# Patient Record
Sex: MALE | Race: WHITE | Employment: FULL TIME | URBAN - METROPOLITAN AREA
[De-identification: names, ages, dates, MRNs, and addresses within clinical notes are randomized per-mention and may not be internally consistent; named-entity substitution may affect disease eponyms.]

---

## 2019-09-01 ENCOUNTER — HOSPITAL ENCOUNTER (EMERGENCY)
Facility: CLINIC | Age: 59
Discharge: HOME | End: 2019-09-01
Attending: PHYSICIAN ASSISTANT | Admitting: PHYSICIAN ASSISTANT
Payer: COMMERCIAL

## 2019-09-01 VITALS
RESPIRATION RATE: 16 BRPM | SYSTOLIC BLOOD PRESSURE: 155 MMHG | DIASTOLIC BLOOD PRESSURE: 81 MMHG | WEIGHT: 280 LBS | OXYGEN SATURATION: 97 % | HEART RATE: 102 BPM | TEMPERATURE: 97.9 F

## 2019-09-01 DIAGNOSIS — H61.21 IMPACTED CERUMEN OF RIGHT EAR: ICD-10-CM

## 2019-09-01 PROCEDURE — 69209 REMOVE IMPACTED EAR WAX UNI: CPT | Mod: RT | Performed by: PHYSICIAN ASSISTANT

## 2019-09-01 PROCEDURE — G0463 HOSPITAL OUTPT CLINIC VISIT: HCPCS | Performed by: PHYSICIAN ASSISTANT

## 2019-09-01 PROCEDURE — 99213 OFFICE O/P EST LOW 20 MIN: CPT | Mod: Z6 | Performed by: PHYSICIAN ASSISTANT

## 2019-09-01 NOTE — ED NOTES
Pt presents for otalgia in the right ear x 1 week. Pt tried OTC ear drops with no success. Dieudonne CARROLL

## 2019-09-01 NOTE — ED AVS SNAPSHOT
[facility] Emergency Department  [address] [address]  Phone:  [phone]  Fax:  [phone]                                    Esdras Snider   MRN: [id number]    Department:  [facility] Emergency Department   Date of Visit:  9/1/2019           After Visit Summary Signature Page    I have received my discharge instructions, and my questions have been answered. I have discussed any challenges I see with this plan with the nurse or doctor.    ..........................................................................................................................................  Patient/Patient Representative Signature      ..........................................................................................................................................  Patient Representative Print Name and Relationship to Patient    ..................................................               ................................................  Date                                   Time    ..........................................................................................................................................  Reviewed by Signature/Title    ...................................................              ..............................................  Date                                               Time          22EPIC Rev 08/18

## 2019-09-01 NOTE — ED PROVIDER NOTES
History     Chief Complaint   Patient presents with     Otalgia     HPI  Esdras Snider is a 59 year old male who presents to the urgent care with concern over right ear plugged which is present for approximately last week.  He additionally complains of decreased hearing.  He has not had any significant pain from the area.  No fever, chills, myalgias, cough, dyspnea, wheezing, nausea, vomiting, diarrhea or abdominal complaints.  He reports that he has had a history of cerumen impaction as previously have required irrigation in the department and states this feels similar.   He attempted to treat at home with OTC drops and irrigation without relief.      Allergies:  No Known Allergies    Problem List:    There are no active problems to display for this patient.     Past Medical History:    History reviewed. No pertinent past medical history.    Past Surgical History:    History reviewed. No pertinent surgical history.    Family History:    History reviewed. No pertinent family history.    Social History:  Marital Status:  Single [1]  Social History     Tobacco Use     Smoking status: Never Smoker     Smokeless tobacco: Never Used   Substance Use Topics     Alcohol use: None     Drug use: None      Medications:      No current outpatient medications on file.    Review of Systems  CONSTITUTIONAL:NEGATIVE for fever, chills, change in weight  INTEGUMENTARY/SKIN: NEGATIVE for worrisome rashes, moles or lesions  EYES: NEGATIVE for vision changes or irritation  ENT/MOUTH: POSITIVE for right ear plugged, decreased hearing and NEGATIVE for nasal congestion, sore throat, ear pain   RESP:NEGATIVE for significant cough or SOB  GI: NEGATIVE for nausea, vomiting, diarrhea, abdominal pain   Physical Exam   BP: (!) 155/81  Pulse: 102  Temp: 97.9  F (36.6  C)  Resp: 16  Weight: 127 kg (280 lb)  SpO2: 97 %  Physical Exam   The right TM is initially obstructed by cerumen, once removed normal: no effusions, no erythema, and normal  landmarks     The right auditory canal is obstructed with cerumen, no tenderness, canal swelling or erythema  The left TM is normal: no effusions, no erythema, and normal landmarks  The left auditory canal is normal and without drainage, edema or erythema  Oropharynx exam is normal: no lesions, erythema, adenopathy or exudate.  GENERAL: no acute distress  EYES: EOMI,  PERRL, conjunctiva clear  NECK: supple, non-tender to palpation, no adenopathy noted  RESP: lungs clear to auscultation - no rales, rhonchi or wheezes  CV: regular rates and rhythm, normal S1 S2, no murmur noted  SKIN: no suspicious lesions or rashes   ED Course        Procedures        Critical Care time:  none        No results found for this or any previous visit (from the past 24 hour(s)).  Medications - No data to display    Earwax irrigated by LPN upon reinspection.  Canal was clear free of cerumen and there is no evidence of otitis media, otitis externa, patient reported symptomatic improvement.      Assessments & Plan (with Medical Decision Making)     I have reviewed the nursing notes.  I have reviewed the findings, diagnosis, plan and need for follow up with the patient.       New Prescriptions    No medications on file     Final diagnoses:   Impacted cerumen of right ear     59-year-old male presents to the urgent care with concern over blood right ear which is been present the last week.  He had mildly elevated heart rate upon arrival which had been decreased significantly at time of examination.  Physical exam findings as described above are consistent with cerumen impaction.  No evidence of otitis media, otitis externa, mastoiditis, TMJ.  Patient tolerated irrigation of the ear with good symptom medic improvement.  He was discharged home stable with instructions to follow-up as needed if new or worsening symptoms.      Disclaimer: This note consists of symbols derived from keyboarding, dictation, and/or voice recognition software. As a  result, there may be errors in the script that have gone undetected.  Please consider this when interpreting information found in the chart.    9/1/2019   [facility] EMERGENCY DEPARTMENT     Mame Petersen PA-C  09/01/19 [id number]

## 2020-01-16 ENCOUNTER — HOSPITAL ENCOUNTER (EMERGENCY)
Facility: CLINIC | Age: 60
Discharge: HOME | End: 2020-01-16
Attending: NURSE PRACTITIONER | Admitting: NURSE PRACTITIONER
Payer: OTHER MISCELLANEOUS

## 2020-01-16 ENCOUNTER — APPOINTMENT (OUTPATIENT)
Dept: CT IMAGING | Facility: CLINIC | Age: 60
End: 2020-01-16
Attending: NURSE PRACTITIONER
Payer: OTHER MISCELLANEOUS

## 2020-01-16 VITALS
WEIGHT: 275 LBS | DIASTOLIC BLOOD PRESSURE: 90 MMHG | RESPIRATION RATE: 16 BRPM | OXYGEN SATURATION: 96 % | SYSTOLIC BLOOD PRESSURE: 171 MMHG | TEMPERATURE: 98 F

## 2020-01-16 DIAGNOSIS — S06.0X0A CONCUSSION WITHOUT LOSS OF CONSCIOUSNESS: ICD-10-CM

## 2020-01-16 DIAGNOSIS — S09.90XA HEAD INJURY, INITIAL ENCOUNTER: ICD-10-CM

## 2020-01-16 PROCEDURE — 70486 CT MAXILLOFACIAL W/O DYE: CPT

## 2020-01-16 PROCEDURE — 99213 OFFICE O/P EST LOW 20 MIN: CPT | Mod: Z6 | Performed by: NURSE PRACTITIONER

## 2020-01-16 PROCEDURE — G0463 HOSPITAL OUTPT CLINIC VISIT: HCPCS | Mod: 25 | Performed by: NURSE PRACTITIONER

## 2020-01-16 NOTE — ED AVS SNAPSHOT
[facility]  [address] [address]  Phone:  [phone]  Fax:  [phone]                                    Esdras Snider   MRN: [id number]    Department:  [facility]   Date of Visit:  1/16/2020           After Visit Summary Signature Page    I have received my discharge instructions, and my questions have been answered. I have discussed any challenges I see with this plan with the nurse or doctor.    ..........................................................................................................................................  Patient/Patient Representative Signature      ..........................................................................................................................................  Patient Representative Print Name and Relationship to Patient    ..................................................               ................................................  Date                                   Time    ..........................................................................................................................................  Reviewed by Signature/Title    ...................................................              ..............................................  Date                                               Time          22EPIC Rev 08/18

## 2020-01-16 NOTE — DISCHARGE INSTRUCTIONS
You may take Tylenol or ibuprofen as needed for headache.  I recommend minimize your screen time which means TV time computer time phone time and reading time to allow your brain to rest.  I recommend follow-up with the provider scheduled on Monday who can reassess you and release you back to work.  Return to the emergency room if you should develop severe headache, forgetfulness, speech difficulty.

## 2020-01-16 NOTE — ED PROVIDER NOTES
History     Chief Complaint   Patient presents with     Head Injury     trailer door hit pt in the left temporal area no LOC workmans comp left eyebrow area at approx 12:10 no bleeding no blood thinners     HPI  Esdras Snider is a 59 year old male with a negative PMH who presents to urgent care with left temporal head injury.    PT states he was getting out to open the trailer doors and the handle of the door swung and hit patient in his left temporal region.  There was no loss of consciousness. Pt felt a little wobbly on his feet and this lasted a couple of hours.    Pt describes his pain as very little and feels tightness and located in left temple region.  Pt rates pain level currently 01/10.  Pt states initially 08/10.  Pt has not taken any medication for the pain.    Pt denies any vision or hearing or speech changes.  Pt denies confusion.    Patient denies fever, aches, chills, sweats, ear pain, eye pain, throat pain, cough, wheezing, shortness of breath, abdominal pain, nausea, vomiting, diarrhea, dysuria, speech difficulty, mental confusion, thoughts of harming self.    Allergies:  No Known Allergies    Problem List:    There are no active problems to display for this patient.       Past Medical History:    History reviewed. No pertinent past medical history.    Past Surgical History:    History reviewed. No pertinent surgical history.    Family History:    No family history on file.    Social History:  Marital Status:  Single [1]  Social History     Tobacco Use     Smoking status: Never Smoker     Smokeless tobacco: Never Used   Substance Use Topics     Alcohol use: None     Drug use: None        Medications:    No current outpatient medications on file.    Review of Systems  As mentioned above in the history present illness. All other systems were reviewed and are negative.    Physical Exam   BP: (!) 171/90  Heart Rate: 90  Temp: 98  F (36.7  C)  Resp: 16  Weight: 124.7 kg (275 lb)  SpO2: 96  %      Physical Exam  Vitals signs and nursing note reviewed.   Constitutional:       General: He is not in acute distress.     Appearance: Normal appearance. He is well-developed. He is not ill-appearing, toxic-appearing or diaphoretic.   HENT:      Head: Normocephalic. Abrasion (noted above and behind left eyebrow1 cm by 3 mm) present. No raccoon eyes, Limon's sign, contusion, right periorbital erythema or left periorbital erythema.      Jaw: There is normal jaw occlusion. No trismus.        Right Ear: Hearing, tympanic membrane, ear canal and external ear normal. No drainage or tenderness. Tympanic membrane is not erythematous or bulging.      Left Ear: Hearing, ear canal and external ear normal. No drainage or tenderness. Tympanic membrane is not erythematous or bulging.      Nose: Mucosal edema present. No rhinorrhea.      Right Sinus: Maxillary sinus tenderness present. No frontal sinus tenderness.      Left Sinus: Maxillary sinus tenderness present. No frontal sinus tenderness.      Mouth/Throat:      Pharynx: Uvula midline. No posterior oropharyngeal erythema or uvula swelling.      Tonsils: No tonsillar exudate. Swellin+ on the right. 1+ on the left.   Eyes:      General: No scleral icterus.        Right eye: No discharge.         Left eye: No discharge.      Conjunctiva/sclera: Conjunctivae normal.      Pupils: Pupils are equal, round, and reactive to light.   Neck:      Musculoskeletal: Normal range of motion.      Trachea: No tracheal deviation.   Cardiovascular:      Rate and Rhythm: Normal rate and regular rhythm.      Heart sounds: Normal heart sounds. No murmur. No friction rub. No gallop.    Pulmonary:      Effort: Pulmonary effort is normal. No respiratory distress.      Breath sounds: No stridor. No wheezing or rales.   Lymphadenopathy:      Cervical: No cervical adenopathy.   Skin:     General: Skin is warm.      Capillary Refill: Capillary refill takes less than 2 seconds.      Findings: No  rash.   Neurological:      General: No focal deficit present.      Mental Status: He is alert and oriented to person, place, and time.      Cranial Nerves: No cranial nerve deficit.      Sensory: No sensory deficit.      Motor: No weakness.      Coordination: Coordination normal.      Gait: Gait normal.   Psychiatric:         Mood and Affect: Mood normal.         Behavior: Behavior is cooperative.         Thought Content: Thought content normal.         ED Course        Procedures    Results for orders placed or performed during the hospital encounter of 01/16/20 (from the past 24 hour(s))   CT Facial Bones without Contrast    Narrative    CT SCAN OF THE PARANASAL SINUSES AND FACE  1/16/2020 3:34 PM     HISTORY: Facial trauma, fx suspected    TECHNIQUE: Radiation dose for this scan was reduced using automated  exposure control, adjustment of the mA and/or kV according to patient  size, or iterative reconstruction technique.  Noncontrast axial scans  and coronal and sagittal reformations.    COMPARISON: None.    FINDINGS: The bony walls of the frontal sinuses, ethmoid sinuses, the  maxillary sinuses are intact. Bony walls of the orbits appear normal.  Paranasal sinuses are clear. Zygomatic arches appear normal. The  mandible is negative. Nasal bones appear normal.      Impression    IMPRESSION: Osseous structures appear intact. No facial fractures are  identified.    REYES OROURKE MD       Medications - No data to display    Assessments & Plan (with Medical Decision Making)  Patient presents to urgent care with work-related injury left-sided temporal injury without loss of consciousness or neurological deficits.  CT of the facial structures completed to assess for fracture and no fracture noted.  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  Appointment made for reassessment.  Reviewed with patient worrisome signs to return for  subdural or epidural hematoma.  Patient discharged in stable condition.     I have reviewed the nursing notes.    I have reviewed the findings, diagnosis, plan and need for follow up with the patient.       [place] Head CT Rule  (calculator)  Background  Assesses need for head imaging in acute trauma  Only validated in adults with GCS 13-15 with witnessed LOC, amnesia to head injury or confusion  Data  59 year old  High Risk Criteria (major criteria)   Of 5 possible items  NEGATIVE    Moderate Risk Criteria (minor criteria)   Of 3 possible items  NEGATIVE    Interpretation  No indications for head imaging    [place] Head CT Rule  (calculator)  Background  Assesses need for head imaging in acute trauma  Only validated in adults 18 and older, with GCS 15 and with blunt head trauma occurring within the prior 24 hours and resulting in loss of consciousness, amnesia, or disorientation  Data   59 year old  Head CT is NOT indicated if ALL of the following criteria ABSENT   Of 7 possible items (headache, vomiting, age>60, intoxicated, anterograde amnesia, supraclavicular signs of trauma, seizure)  Physical evidence of trauma above the clavicles  Interpretation  One or more Head CT criteria are present; Head imaging may be indicated        New Prescriptions    No medications on file       Final diagnoses:   Head injury, initial encounter   Concussion without loss of consciousness       1/16/2020   [facility] EMERGENCY DEPARTMENT     Concepción Valenzuela, GRABIEL CNP  01/16/20 6710

## 2020-01-20 ENCOUNTER — OFFICE VISIT (OUTPATIENT)
Dept: FAMILY MEDICINE | Facility: CLINIC | Age: 60
End: 2020-01-20
Payer: COMMERCIAL

## 2020-01-20 VITALS
WEIGHT: 272.4 LBS | HEART RATE: 92 BPM | SYSTOLIC BLOOD PRESSURE: 124 MMHG | DIASTOLIC BLOOD PRESSURE: 90 MMHG | TEMPERATURE: 98.3 F | OXYGEN SATURATION: 95 % | BODY MASS INDEX: 38.14 KG/M2 | HEIGHT: 71 IN

## 2020-01-20 DIAGNOSIS — I10 HYPERTENSION, UNSPECIFIED TYPE: ICD-10-CM

## 2020-01-20 DIAGNOSIS — S09.90XD CLOSED HEAD INJURY, SUBSEQUENT ENCOUNTER: Primary | ICD-10-CM

## 2020-01-20 PROCEDURE — 99213 OFFICE O/P EST LOW 20 MIN: CPT | Performed by: FAMILY MEDICINE

## 2020-01-20 RX ORDER — NEBULIZER AND COMPRESSOR
1 EACH MISCELLANEOUS 2 TIMES DAILY
Qty: 1 KIT | Refills: 1 | Status: SHIPPED | OUTPATIENT
Start: 2020-01-20 | End: 2022-03-02

## 2020-01-20 ASSESSMENT — MIFFLIN-ST. JEOR: SCORE: 2077.73

## 2020-01-20 NOTE — LETTER
[facility]  [address] [place] [address]  [phone]    RE:  Esdras Snider  [address]  [facility] [id number] [phone] (home)   January 20, 2020    TO WHOM IT MAY CONCERN:    Esdras Snider was seen on 1/20/2020.  Please excuse him until 1/21/2020 due to injury.  He may return to work without restrictions on 1/21/2020.    Cordially,      ANCA BUSTOS MD.

## 2020-01-20 NOTE — PATIENT INSTRUCTIONS
Patient Education     Uncontrolled High Blood Pressure (Established)    Your blood pressure was unusually high today. This can occur if you ve missed doses of your blood pressure medicine. Or it can happen if you are taking other medicines. These include some asthma inhalers, decongestants, diet pills, and street drugs like cocaine and amphetamine.  Other causes include:    Weight gain    More salt in your diet    Smoking    Caffeine  Your blood pressure can also rise if you are emotionally upset or in intense pain. It may go back to normal after a period of rest.  Blood pressure measurements are given as 2 numbers. Systolic blood pressure is the upper number. This is the pressure when the heart contracts. Diastolic blood pressure is the lower number. This is the pressure when the heart relaxes between beats. You will see your blood pressure readings written together. For example, a person with a systolic pressure of 118 and a diastolic pressure of 78 will have 118/78 written in the medical record. To be high blood pressure, the numbers must be higher when tested over a period of time.  Blood pressure is categorized as normal, elevated, or stage 1 or stage 2 high blood pressure:    Normal blood pressure is systolic of less than 120 and diastolic of less than 80 (120/80)    Elevated blood pressure is systolic of 120 to 129 and diastolic less than 80    Stage 1 high blood pressure is systolic is 130 to 139 or diastolic between 80 to 89    Stage 2 high blood pressure is when systolic is 140 or higher or the diastolic is 90 or higher  Uncontrolled high blood pressure can cause serious health problems. It raises your risk for heart attack, stroke, and heart failure. In general, if you have high blood pressure, keeping your blood pressure below 130/80 mmHg may help prevent these problems. Your healthcare provider may prescribe medicine to help control blood pressure if lifestyle changes are not enough.  Home care  It s  important to take steps to lower your blood pressure. If you are taking blood pressure medicine, the guidelines below may help you need less or no medicines in the future.    Start a weight-loss program if you are overweight.    Cut back on the amount of salt in your diet:  ? Avoid high-salt foods like olives, pickles, smoked meats, and salted potato chips.  ? Don t add salt to your food at the table.  ? Use only small amounts of salt when cooking.    Start an exercise program. Talk with your healthcare provider about what exercise program is best for you. It doesn t have to be difficult. Even brisk walking for 20 minutes 3 times a week is a good form of exercise.    Avoid medicines that stimulates the heart. This includes many over-the-counter cold and sinus decongestant pills and sprays, as well as diet pills. Check the warnings about high blood pressure on the label. Before purchasing any over-the-counter medicines or supplements, always ask the pharmacist about the product's potential interaction with your high blood pressure and your medicines.    Stimulants such as amphetamine or cocaine could be lethal for someone with high blood pressure. Never take these.    Limit how much caffeine you drink. Or switch to noncaffeinated beverages.    Stop smoking. If you are a long-time smoker, this can be hard. Enroll in a stop-smoking program to make it more likely that you will succeed. Talk with your provider about ways to quit.    Learn how to handle stress better. This is an important part of any program to lower blood pressure. Learn ways to relax. These include meditation, yoga, and biofeedback.    If medicines were prescribed, take them exactly as directed. Missing doses may cause your blood pressure to get out of control.    If you miss a dose or doses of your medicines, check with your healthcare provider or pharmacist about what to do.    Consider buying an automatic blood pressure machine. Your provider may  recommend a certain type. You can get one of these at most pharmacies. Measure your blood pressure twice a day, in the morning, and in the late afternoon. Keep a written record of your home blood pressure readings and take the record to your medical appointments.  Here are some additional guidelines on home blood pressure monitoring from the American [employer] Association.    Don't smoke or drink coffee for 30 minutes    Go to the bathroom before the test.    Relax for 5 minutes before taking the measurement.    Sit correctly. Be sure your back is supported. Don't sit on a couch or soft chair. Uncross your feet and place them flat on the floor. Place your arm on a solid, flat surface like a table with the upper arm at heart level. Make certain the middle of the cuff is directly above the bend of the elbow. Check the monitor's instruction manual for an illustration.    Take multiple readings. When you measure, take 2 or 3 readings one minute apart and record all of the results.    Take your blood pressure at the same time every day, or as your healthcare provider recommends.    Record the date, time, and blood pressure reading.    Take the record with you to your next appointment. If your blood pressure monitor has a built-in memory, simply take the monitor with you to your next appointment.    Call your provider if you have several high readings. Don't be frightened by a single high reading, but if you get several high readings, check in with your healthcare provider.    Note: When blood pressure reaches a systolic (top number) of 180 or higher or a diastolic (bottom number) of 110 or higher, emergency medical treatment is required. Call your healthcare provider immediately.  Follow-up care  Regular visits to your own healthcare provider for blood pressure and medicine checks are an important part of your care. Make a follow-up appointment as directed. Bring the record of your home blood pressure readings to the  appointment.  When to seek medical advice  Call your healthcare provider right away if any of these occur:    Blood pressure reaches a systolic (top number) of 180 or higher or diastolic (bottom number) of 110 or higher, emergency medical treatment is required.    Chest, arm, shoulder, neck, or upper back pain    Shortness of breath    Severe headache    Throbbing or rushing sound in the ears    Nosebleed    Extreme drowsiness, confusion, or fainting    Dizziness or dizziness with spinning sensation (vertigo)    Weakness in an arm or leg or on one side of the face    Trouble speaking or seeing   Date Last Reviewed: 1/1/2017 2000-2019 [employer]. [address] [place] [address]. All rights reserved. This information is not intended as a substitute for professional medical care. Always follow your healthcare professional's instructions.

## 2020-01-20 NOTE — NURSING NOTE
"Initial BP (!) 134/92 (BP Location: Left arm, Patient Position: Sitting, Cuff Size: Adult Large)   Pulse 92   Temp 98.3  F (36.8  C) (Tympanic)   Ht 1.803 m (5' 11\")   Wt 123.6 kg (272 lb 6.4 oz)   SpO2 95%   BMI 37.99 kg/m   Estimated body mass index is 37.99 kg/m  as calculated from the following:    Height as of this encounter: 1.803 m (5' 11\").    Weight as of this encounter: 123.6 kg (272 lb 6.4 oz). .       "

## 2020-01-20 NOTE — PROGRESS NOTES
"    SUBJECTIVE:                                                    Esdras Snider is 59 year old male   Chief Complaint   Patient presents with     Hospital F/U     Head injury hit in the head with the handle from a trailer on Thursday evening seen at urgent care          Problem list and histories reviewed & adjusted, as indicated.  Additional history:  Mild tenderness left forehead, no headache or vision difficulty.    There is no problem list on file for this patient.    No past surgical history on file.    Social History     Tobacco Use     Smoking status: Never Smoker     Smokeless tobacco: Never Used   Substance Use Topics     Alcohol use: Not on file     No family history on file.      No current outpatient medications on file.     No Known Allergies  No lab results found.   BP Readings from Last 3 Encounters:   01/20/20 (!) 134/92   01/16/20 (!) 171/90   09/01/19 (!) 155/81    Wt Readings from Last 3 Encounters:   01/20/20 123.6 kg (272 lb 6.4 oz)   01/16/20 124.7 kg (275 lb)   09/01/19 127 kg (280 lb)         ROS:  Constitutional, HEENT, cardiovascular, pulmonary, gi and gu systems are negative, except as otherwise noted.    OBJECTIVE:                                                    BP (!) 134/92 (BP Location: Left arm, Patient Position: Sitting, Cuff Size: Adult Large)   Pulse 92   Temp 98.3  F (36.8  C) (Tympanic)   Ht 1.803 m (5' 11\")   Wt 123.6 kg (272 lb 6.4 oz)   SpO2 95%   BMI 37.99 kg/m    GENERAL APPEARANCE ADULT: Alert, no acute distress  HENT: Ears and TMs normal, oral mucosa and posterior oropharynx normal  RESP: lungs clear to auscultation   NEURO: Alert, oriented, speech and mentation normal, Cranial nerves 2-12 are normal., Strength normal and symmetrical in upper and lower extremities., Reflexes 2+ and symmetrical at biceps, triceps, brachioradialis, knees and ankles  PSYCH: mentation appears normal., affect and mood normal  Diagnostic Test Results:  none      ASSESSMENT/PLAN:         "                                            1. Closed head injury, subsequent encounter  No signs or sxs of head bleed, injury occurred 4 days ago.  Ok to return to work.      2. Hypertension, unspecified type  Handout on high blood pressure given  - Blood Pressure Monitoring (ADULT BLOOD PRESSURE CUFF LG) KIT; 1 Device 2 times daily  Dispense: 1 kit; Refill: 1    Trina Freeman MD  [facility]

## 2020-01-27 PROBLEM — I10 HYPERTENSION, UNSPECIFIED TYPE: Status: ACTIVE | Noted: 2020-01-27

## 2020-05-14 ENCOUNTER — VIRTUAL VISIT (OUTPATIENT)
Dept: FAMILY MEDICINE | Facility: CLINIC | Age: 60
End: 2020-05-14
Payer: COMMERCIAL

## 2020-05-14 DIAGNOSIS — H10.32 ACUTE CONJUNCTIVITIS OF LEFT EYE, UNSPECIFIED ACUTE CONJUNCTIVITIS TYPE: Primary | ICD-10-CM

## 2020-05-14 PROCEDURE — 99213 OFFICE O/P EST LOW 20 MIN: CPT | Mod: 95 | Performed by: NURSE PRACTITIONER

## 2020-05-14 RX ORDER — POLYMYXIN B SULFATE AND TRIMETHOPRIM 1; 10000 MG/ML; [USP'U]/ML
1-2 SOLUTION OPHTHALMIC EVERY 6 HOURS
Qty: 10 ML | Refills: 0 | Status: SHIPPED | OUTPATIENT
Start: 2020-05-14 | End: 2020-12-10

## 2020-05-14 NOTE — PATIENT INSTRUCTIONS
Patient Education     Conjunctivitis, Nonspecific    The membrane that covers the white part of your eye (the conjunctiva) is inflamed. Inflammation happens when your body responds to an injury, allergic reaction, infection, or illness. Symptoms of inflammation in the eye may include redness, irritation, itching, swelling, or burning. These symptoms should go away within the next 24 hours. Conjunctivitis may be related to a particle that was in your eye. If so, it may wash out with your tears or irrigation treatment. Being exposed to liquid chemicals or fumes may also cause this reaction.   Home care    Apply a cold pack over the eye for 20 minutes at a time. This will reduce pain. To make a cold pack, put ice cubes in a plastic bag that seals at the top. Wrap the bag in a clean, thin towel or cloth.    Artificial tears may be prescribed to reduce irritation or redness.  These should be used 3 to 4 times a day.    You may use acetaminophen or ibuprofen to control pain, unless another medicine was prescribed. (Note: If you have chronic liver or kidney disease, or if you have ever had a stomach ulcer or gastrointestinal bleeding, talk with your healthcare provider before using these medicines.)  Follow-up care  Follow up with your healthcare provider, or as advised.  When to seek medical advice  Call your healthcare provider right away if any of these occur:    Increased eyelid swelling    Increased eye pain    Increased redness or drainage from the eye    Increased blurry vision or increased sensitivity to light    Failure of normal vision to return within 24 to 48 hours  Date Last Reviewed: 7/1/2017 2000-2019 [employer]. [address] [place] [address], [place] [address]. All rights reserved. This information is not intended as a substitute for professional medical care. Always follow your healthcare professional's instructions.

## 2020-05-14 NOTE — PROGRESS NOTES
"Esdras Snider is a 60 year old male who is being evaluated via a billable telephone visit.      The patient has been notified of following:     \"This telephone visit will be conducted via a call between you and your physician/provider. We have found that certain health care needs can be provided without the need for a physical exam.  This service lets us provide the care you need with a short phone conversation.  If a prescription is necessary we can send it directly to your pharmacy.  If lab work is needed we can place an order for that and you can then stop by our lab to have the test done at a later time.    Telephone visits are billed at different rates depending on your insurance coverage. During this emergency period, for some insurers they may be billed the same as an in-person visit.  Please reach out to your insurance provider with any questions.    If during the course of the call the physician/provider feels a telephone visit is not appropriate, you will not be charged for this service.\"    Patient has given verbal consent for Telephone visit?  Yes    What phone number would you like to be contacted at? [phone]    How would you like to obtain your AVS? Does not want AVS    Subjective     Esdras Snider is a 60 year old male who presents to clinic today for the following health issues:    HPI  Sore eye       Duration:  X 3 days     Description (location/character/radiation): left eye red, mattery, sore. NO fever     Intensity:  mild    Accompanying signs and symptoms: as noted     History (similar episodes/previous evaluation):     Precipitating or alleviating factors:     Therapies tried and outcome: None      No vision changes - just blurry initially.  Mild eye lid swelling.  No eye pain.    More irritated.  No recent illness, trauma.    Does not wear contacts.    Patient Active Problem List   Diagnosis     UC (ulcerative colitis) (H)     Ulcerative rectosigmoiditis without complication (H)     " Hypertension, unspecified type     History reviewed. No pertinent surgical history.    Social History     Tobacco Use     Smoking status: Former Smoker     Smokeless tobacco: Never Used   Substance Use Topics     Alcohol use: Yes     History reviewed. No pertinent family history.      Current Outpatient Medications   Medication Sig Dispense Refill     trimethoprim-polymyxin b (POLYTRIM) 76215-5.1 UNIT/ML-% ophthalmic solution Place 1-2 drops Into the left eye every 6 hours 10 mL 0     Blood Pressure Monitoring (ADULT BLOOD PRESSURE CUFF LG) KIT 1 Device 2 times daily 1 kit 1     No Known Allergies  No lab results found.   BP Readings from Last 3 Encounters:   01/20/20 (!) 124/90   01/16/20 (!) 171/90   09/01/19 (!) 155/81    Wt Readings from Last 3 Encounters:   01/20/20 123.6 kg (272 lb 6.4 oz)   01/16/20 124.7 kg (275 lb)   09/01/19 127 kg (280 lb)                    Reviewed and updated as needed this visit by Provider  Tobacco  Allergies  Meds  Problems  Med Hx  Surg Hx  Fam Hx         Review of Systems   Constitutional, HEENT, cardiovascular, pulmonary, GI, , musculoskeletal, neuro, skin, endocrine and psych systems are negative, except as otherwise noted.       Objective   Reported vitals:  There were no vitals taken for this visit.   healthy, alert and no distress  PSYCH: Alert and oriented times 3; coherent speech, normal   rate and volume, able to articulate logical thoughts, able   to abstract reason, no tangential thoughts, no hallucinations   or delusions  His affect is normal and pleasant  RESP: No cough, no audible wheezing, able to talk in full sentences  Remainder of exam unable to be completed due to telephone visits    Diagnostic Test Results:  Labs reviewed in Epic        Assessment/Plan:  1. Acute conjunctivitis of left eye, unspecified acute conjunctivitis type  The risks, benefits and treatment options of prescribed medications or other treatments have been discussed with the patient.  The patient verbalized their understanding and should call or follow up if no improvement or if they develop further problems.    - trimethoprim-polymyxin b (POLYTRIM) 38184-4.1 UNIT/ML-% ophthalmic solution; Place 1-2 drops Into the left eye every 6 hours  Dispense: 10 mL; Refill: 0    Return if symptoms worsen or fail to improve.      Phone call duration:  5 minutes    Seda Mcclelland, NP

## 2020-09-08 DIAGNOSIS — H10.32 ACUTE CONJUNCTIVITIS OF LEFT EYE, UNSPECIFIED ACUTE CONJUNCTIVITIS TYPE: ICD-10-CM

## 2020-09-09 NOTE — TELEPHONE ENCOUNTER
Seda,    Looks like patient was given this medication back on 5/14/2020 for conjunctivitis.  Please advise on refill. Heidi LUCIO RN

## 2020-09-10 RX ORDER — POLYMYXIN B SULFATE AND TRIMETHOPRIM 1; 10000 MG/ML; [USP'U]/ML
1-2 SOLUTION OPHTHALMIC EVERY 6 HOURS
Qty: 10 ML | Refills: 0 | OUTPATIENT
Start: 2020-09-10

## 2020-09-10 NOTE — TELEPHONE ENCOUNTER
Polytrim eye drops prescribed in May for acute conjuctivitis.    I spoke with pt.  Symptoms resolved in May after being treated.  Recurred about a week ago with watery left eye and crusting of both eyes.    Advised virtual appt.     Pt says that he will call back to schedule appt if no improvement over the next couple days.    Dunia Dean RN

## 2020-09-10 NOTE — TELEPHONE ENCOUNTER
Call and find out why patient needs refill - typically this is not something that is refilled.  ? Allergies.  LUTHER Raman

## 2020-12-10 ENCOUNTER — OFFICE VISIT (OUTPATIENT)
Dept: FAMILY MEDICINE | Facility: CLINIC | Age: 60
End: 2020-12-10
Payer: COMMERCIAL

## 2020-12-10 VITALS
HEIGHT: 71 IN | WEIGHT: 260.8 LBS | BODY MASS INDEX: 36.51 KG/M2 | OXYGEN SATURATION: 97 % | DIASTOLIC BLOOD PRESSURE: 80 MMHG | SYSTOLIC BLOOD PRESSURE: 146 MMHG | HEART RATE: 86 BPM | TEMPERATURE: 96.5 F

## 2020-12-10 DIAGNOSIS — R03.0 ELEVATED BP WITHOUT DIAGNOSIS OF HYPERTENSION: ICD-10-CM

## 2020-12-10 DIAGNOSIS — L70.0 ACNE VULGARIS: ICD-10-CM

## 2020-12-10 DIAGNOSIS — E66.01 MORBID OBESITY (H): ICD-10-CM

## 2020-12-10 DIAGNOSIS — M54.50 ACUTE BILATERAL LOW BACK PAIN WITHOUT SCIATICA: Primary | ICD-10-CM

## 2020-12-10 PROCEDURE — 99214 OFFICE O/P EST MOD 30 MIN: CPT | Performed by: NURSE PRACTITIONER

## 2020-12-10 RX ORDER — PREDNISONE 20 MG/1
40 TABLET ORAL DAILY
Qty: 10 TABLET | Refills: 0 | Status: SHIPPED | OUTPATIENT
Start: 2020-12-10 | End: 2020-12-15

## 2020-12-10 RX ORDER — MINOCYCLINE HYDROCHLORIDE 100 MG/1
100 TABLET ORAL 2 TIMES DAILY
Qty: 60 TABLET | Refills: 2 | Status: SHIPPED | OUTPATIENT
Start: 2020-12-10 | End: 2021-07-15

## 2020-12-10 ASSESSMENT — PAIN SCALES - GENERAL: PAINLEVEL: MODERATE PAIN (5)

## 2020-12-10 ASSESSMENT — MIFFLIN-ST. JEOR: SCORE: 2020.11

## 2020-12-10 NOTE — PATIENT INSTRUCTIONS
1.  Take prednisone as directed.  2.  Use exercises that strengthen your back that I have given in a handout.  3.  Follow-up if any changes in symptoms or they become worse.  4.  Lower salt intake and start exercise.  5.  If BP remains elevated despite lifestyle changes, we may need to start BP medication.    Use benzoyl peroxide once or twice daily to back to dry out the back to reduce cyst formation.    Start minocycline twice daily and stop when symptoms improve, can take up to 12 weeks and then would need to follow-up if not improving.    Patient Education     Exercises to Strengthen Your Lower Back  Strong lower back and abdominal muscles work together to support your spine. The exercises below will help strengthen the lower back. It's important that you start exercising slowly and increase levels gradually.   Always start any exercise program with stretching. If you feel pain while doing any of these exercises, stop and talk to your doctor about a more specific exercise program that better suits your condition.    Low back stretch  The point of stretching is to make you more flexible and increase your range of motion. Stretch only as much as you are able. Stretch slowly. Don't push your stretch to the limit. If at any point you feel pain while stretching, this is your (temporary) limit.     Lie on your back with your knees bent and both feet on the ground.    Slowly raise your left knee to your chest as you flatten your lower back against the floor. Hold for 5 seconds.    Relax and repeat the exercise with your right knee.    Do 10 of these exercises for each leg.    Repeat hugging both knees to your chest at the same time.  Building lower back strength  Start your exercise routine with 10 to 30 minutes a day, 1 to 3 times a day.  Initial exercises  Lying on your back:  1. Ankle pumps. Move your foot up and down, towards your head, and then away. Repeat 10 times with each foot.  2. Heel slides. Slowly bend  your knee, drawing the heel of your foot towards you. Then slide your heel/foot from you, straightening your knee. Don't lift your foot off the floor (this is not a leg lift).  3. Abdominal contraction. Bend your knees and put your hands on your stomach. Tighten your stomach muscles. Hold for 5 seconds, then relax. Repeat 10 times.  4. Straight leg raise. Bend one leg at the knee and keep the other leg straight. Tighten your stomach muscles. Slowly lift your straight leg 6 to 12 inches off the floor and hold for up to 5 seconds. Repeat 10 times on each side.  Standin. Wall squats. Stand with your back against the wall. Move your feet about 12 inches away from the wall. Tighten your stomach muscles, and slowly bend your knees until they are at about a 45 degree angle. Don't go down too far. Hold about 5 seconds. Then slowly return to your starting position. Repeat 10 times.  2. Heel raises. Stand facing the wall. Slowly raise the heels of your feet up and down, while keeping your toes on the floor. If you have trouble balancing, you can touch the wall with your hands. Repeat 10 times.  More advanced exercises  When you feel comfortable enough, try these exercises.  1. Kneeling lumbar extension. Start on your hands and knees. At the same time, raise and straighten your right arm and left leg until they are parallel to the ground. Hold for 2 seconds and come back slowly to a starting position. Repeat with left arm and right leg, alternating 10 times.  2. Prone lumbar extension. Lie face down, arms extended overhead, palms on the floor. At the same time, raise your right arm and left leg as high as comfortably possible. Hold for 10 seconds and slowly return to start. Repeat with left arm and right leg, alternating 10 times. Gradually build up to 20 times. (Advanced: Repeat this exercise raising both arms and both legs a few inches off the floor at the same time. Hold for 5 seconds and release.)  3. Pelvic tilt. Lie  on the floor on your back with your knees bent at 90 degrees. Your feet should be flat on the floor. Inhale, exhale, then slowly contract your abdominal muscles bringing your navel toward your spine. Let your pelvis rock back until your lower back is flat on the floor. Hold for 10 seconds while breathing smoothly.  4. Abdominal crunch. Perform a pelvic tilt (above) flattening your lower back against the floor. Holding the tension in your abdominal muscles, take another breath and raise your shoulder blades off the ground (this is not a full sit-up). Keep your head in line with your body (don t bend your neck forward). Hold for 2 seconds, then slowly lower.  [employer] last reviewed this educational content on 8/1/2019 2000-2020 [employer]. [address] [place] [address], [place] [address]. All rights reserved. This information is not intended as a substitute for professional medical care. Always follow your healthcare professional's instructions.

## 2020-12-10 NOTE — PROGRESS NOTES
Subjective     Esdras Snider is a 60 year old male who presents to clinic today for the following health issues:    HPI       Chief Complaint   Patient presents with     Back Pain     low back pain x 3  weeks      Health Maintenance     Declines immuniztions today      Patient has high BP today.  He states that he controls this with exercise and weight loss but has not been able to go to the gym and thinks that along with back pain may be cause for elevation.    Patient also would like treatment for ongoing back acne.  He states that he usually keeps it controlled with tanning beds and being out in the sun but would like to try something else.       Back Pain  Onset/Duration: x 3 weeks   Description:   Location of pain: low back bilateral  Character of pain: dull ache constant  Pain radiation: Flanks  New numbness or weakness in legs, not attributed to pain: no   Intensity: , moderate  Progression of Symptoms: same  History:   Specific cause: none  Pain interferes with job:  no   History of back problems: no prior back problems  Any previous MRI or X-rays: None  Sees a specialist for back pain: No  Alleviating factors:   Improved by: heat and NSAIDs    Precipitating factors:  Worsened by: Nothing  Therapies tried and outcome: heat and NSAIDs - short term relief    Accompanying Signs & Symptoms:  Risk of Fracture: None  Risk of Cauda Equina: None  Risk of Infection: None  Risk of Cancer: None  Risk of Ankylosing Spondylitis: Onset at age <35, male, AND morning back stiffness no    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      [id number]}  Review of Systems   CONSTITUTIONAL: NEGATIVE for fever, chills, change in weight  RESP: NEGATIVE for significant cough or SOB  CV: NEGATIVE for chest pain, palpitations or peripheral edema  MUSCULOSKELETAL: POSITIVE  for back pain low with no radiation of pain  PSYCHIATRIC: NEGATIVE for changes in mood or affect  ROS otherwise negative      Objective    BP  "(!) 146/80 (BP Location: Left arm, Patient Position: Sitting, Cuff Size: Adult Large)   Pulse 86   Temp 96.5  F (35.8  C) (Tympanic)   Ht 1.803 m (5' 11\")   Wt 118.3 kg (260 lb 12.8 oz)   SpO2 97%   BMI 36.37 kg/m    Body mass index is 36.37 kg/m .  Physical Exam   GENERAL: healthy, alert and no distress  RESP: lungs clear to auscultation - no rales, rhonchi or wheezes  CV: regular rate and rhythm, normal S1 S2, no S3 or S4, no murmur, click or rub, no peripheral edema and peripheral pulses strong  MS: no gross musculoskeletal defects noted, no edema  SKIN: scarring along entire back with some cystic areas that are palpated randomly that are small and have not come to head, no redness or tenderness.  Comprehensive back pain exam:  No tenderness, Range of motion not limited by pain, Lower extremity strength functional and equal on both sides, Lower extremity reflexes within normal limits bilaterally, Lower extremity sensation normal and equal on both sides and Straight leg raise negative bilaterally  PSYCH: mentation appears normal, affect normal/bright      Assessment & Plan     Acute bilateral low back pain without sciatica  Treating with prednisone for 5 days and gave patient exercises for low back stretches.  Follow-up if any persistent or worsening symptoms in 2 weeks.  - predniSONE (DELTASONE) 20 MG tablet; Take 2 tablets (40 mg) by mouth daily for 5 days    Morbid obesity (H)  Discussed diet and exercise for weight loss to improve BP.    Elevated blood pressure without diagnosis of hypertension  Discussed low salt and exercise to reduce weight for management since this is borderline.  Patient declines medication at this time and history has been borderline.  Recommend checking BP from time to time and if it stays elevated would need to start medication for treatment.    Acne vulgaris  Recommended benzoyl peroxide once or twice daily to back.  Ordered minocycline 100 mg twice daily for up to 12 weeks " using it only for the time needed to improve symptoms.  Recommend follow-up if persistent symptoms despite treatment or in 3 months for re-evaluation.  - minocycline (DYNACIN) 100 MG tablet; Take 1 tablet (100 mg) by mouth 2 times daily    See Patient Instructions    Return in about 2 weeks (around 12/24/2020).    Sherice Alfredo NP  [facility]

## 2020-12-20 ENCOUNTER — VIRTUAL VISIT (OUTPATIENT)
Dept: FAMILY MEDICINE | Facility: OTHER | Age: 60
End: 2020-12-20
Payer: COMMERCIAL

## 2020-12-20 DIAGNOSIS — Z20.822 ENCOUNTER FOR LABORATORY TESTING FOR COVID-19 VIRUS: Primary | ICD-10-CM

## 2020-12-20 PROCEDURE — 99421 OL DIG E/M SVC 5-10 MIN: CPT | Performed by: PREVENTIVE MEDICINE

## 2020-12-20 PROCEDURE — U0003 INFECTIOUS AGENT DETECTION BY NUCLEIC ACID (DNA OR RNA); SEVERE ACUTE RESPIRATORY SYNDROME CORONAVIRUS 2 (SARS-COV-2) (CORONAVIRUS DISEASE [COVID-19]), AMPLIFIED PROBE TECHNIQUE, MAKING USE OF HIGH THROUGHPUT TECHNOLOGIES AS DESCRIBED BY CMS-2020-01-R: HCPCS | Performed by: FAMILY MEDICINE

## 2020-12-20 NOTE — PROGRESS NOTES
"Date: 2020 12:20:33  Clinician: Andres Dewitt  Clinician NPI: [id number]  Patient: Esdras Snider  Patient : 1960  Patient Address: [address] [facility], [place] [address]  Patient Phone: [phone]  Visit Protocol: URI  Patient Summary:  Esdras is a 60 year old ( : 1960 ) male who initiated a [facility] Visit for COVID-19 (Coronavirus) evaluation and screening. When asked the question \"Please sign me up to receive news, health information and promotions from [facility].\", Esdras responded \"No\".    Esdras states his symptoms started suddenly 3-4 days ago.   His symptoms consist of myalgia, anosmia, a headache, malaise, a sore throat, and ageusia.   Symptom details     Sore throat: Esdras reports having mild throat pain (1-3 on a 10 point pain scale), does not have exudate on his tonsils, and can swallow liquids. He is not sure if the lymph nodes in his neck are enlarged. A rash has not appeared on the skin since the sore throat started.     Headache: He states the headache is mild (1-3 on a 10 point pain scale).      Esdras denies having diarrhea, facial pain or pressure, ear pain, wheezing, fever, cough, nasal congestion, nausea, chills, teeth pain, vomiting, and rhinitis. He also denies double sickening (worsening symptoms after initial improvement) and having recent facial or sinus surgery in the past 60 days. He is not experiencing dyspnea.   Precipitating events  Within the past week, Esdras has not been exposed to someone with strep throat. He has not recently been exposed to someone with influenza. Esdras has not been in close contact with any high risk individuals.   Pertinent COVID-19 (Coronavirus) information  Esdras does not work or volunteer as healthcare worker or a . In the past 14 days, Esdras has not worked or volunteered at a healthcare facility or group living setting.   In the past 14 days, he also has not lived in a congregate living setting.   Esdras has not had " a close contact with a laboratory-confirmed COVID-19 patient within 14 days of symptom onset.    Esdras has not been tested for COVID-19.   Pertinent medical history  Esdras has taken an antibiotic medication in the past month. Antibiotic details as reported by the patient (free text): Minocycline.  Acne on my back started taking it last week   He has not been told by his provider to avoid NSAIDs.   Esdras does not have diabetes. He denies having immunosuppressive conditions (e.g., chemotherapy, HIV, organ transplant, long-term use of steroids or other immunosuppressive medications, splenectomy). He denies having congestive heart failure and severe COPD. He does not have asthma.   Esdras does not need a return to work/school note.   Esdras does not smoke or use smokeless tobacco.   Weight: 275 lbs    MEDICATIONS: minocycline oral, ALLERGIES: NKDA  Clinician Response:  Dear Esdras,   Your symptoms show that you may have coronavirus (COVID-19). This illness can cause fever, cough and trouble breathing. Many people get a mild case and get better on their own. Some people can get very sick.  Because you also reported sore throat I would like to also test you for Strep Throat to determine if we need to treat you for that as well.  What should I do?  We would like to test you for the COVID-19 virus and the streptococcus bacteria.   1. Please call [phone] to schedule your visit. Explain that you were referred by [facility] to have a COVID-19 test and a Strep test. Be ready to share your [facility] visit ID number.  * If you need to schedule in [facility] please call [phone] or for [employer] [place] employees please call [phone]  The following will serve as your written order for the COVID Test, ordered by me, for the indication of suspected COVID [Z20.828]: The test will be ordered in [employer], our electronic health record, after you are scheduled. It will show as ordered and authorized by Jayy Watson MD.  Order: COVID-19  "(Coronavirus) PCR for SYMPTOMATIC testing from [facility].  The following will serve as your written order for this Strep Test, ordered by me, for the indication of suspected Strep throat [R07.0]: The test will be ordered in [employer], our electronic health record, after you are scheduled. It will show as ordered and authorized by Jayy Watson MD.  Order: Streptococcus A Rapid Screen with reflex to PCR testing from [facility].  2. When it's time for your COVID and Strep test:  Stay at least 6 feet away from others. (If someone will drive you to your test, stay in the backseat, as far away from the  as you can.)  Cover your mouth and nose with a mask, tissue or washcloth.  Go straight to the testing site. Don't make any stops on the way there or back.  3.Starting now: Stay home and away from others (self-isolate) until:  You've had no fever---and no medicine that reduces fever---for one full day (24 hours). And...  Your other symptoms have gotten better. For example, your cough or breathing has improved. And...  At least 10 days have passed since your symptoms started.  During this time, don't leave the house except for testing or medical care.  Stay in your own room, even for meals. Use your own bathroom if you can.  Stay away from others in your home. No hugging, kissing or shaking hands. No visitors.  Don't go to work, school or anywhere else.  Clean \"high touch\" surfaces often (doorknobs, counters, handles, etc.). Use a household cleaning spray or wipes. You'll find a full list of  on the EPA website: www.epa.gov/pesticide-registration/list-n-disinfectants-use-against-sars-cov-2.  Cover your mouth and nose with a mask, tissue or washcloth to avoid spreading germs.  Wash your hands and face often. Use soap and water.  Caregivers in these groups are at risk for severe illness due to COVID-19:  o People 65 years and older  o People who live in a nursing home or long-term care facility  o People with chronic disease " (lung, heart, cancer, diabetes, kidney, liver, immunologic)  o People who have a weakened immune system, including those who:  Are in cancer treatment  Take medicine that weakens the immune system, such as corticosteroids  Had a bone marrow or organ transplant  Have an immune deficiency  Have poorly controlled HIV or AIDS  Are obese (body mass index of 40 or higher)  Smoke regularly  o Caregivers should wear gloves while washing dishes, handling laundry and cleaning bedrooms and bathrooms.  o Use caution when washing and drying laundry: Don't shake dirty laundry, and use the warmest water setting that you can.  o For more tips, go to www.cdc.gov/coronavirus/2019-ncov/downloads/10Things.pdf.  4.Sign up for [employer]. We know it's scary to hear that you might have COVID-19. We want to track your symptoms to make sure you're okay over the next 2 weeks. Please look for an email from [employer]---this is a free, online program that we'll use to keep in touch. To sign up, follow the link in the email. Learn more at [email or web address]  How can I take care of myself?  Get lots of rest. Drink extra fluids (unless a doctor has told you not to).  Take Tylenol (acetaminophen) for fever or pain. If you have liver or kidney problems, ask your family doctor if it's okay to take Tylenol.  Adults can take either:  650 mg (two 325 mg pills) every 4 to 6 hours, or...  1,000 mg (two 500 mg pills) every 8 hours as needed.  Note: Don't take more than 3,000 mg in one day. Acetaminophen is found in many medicines (both prescribed and over-the-counter medicines). Read all labels to be sure you don't take too much.  For children, check the Tylenol bottle for the right dose. The dose is based on the child's age or weight.  If you have other health problems (like cancer, heart failure, an organ transplant or severe kidney disease): Call your specialty clinic if you don't feel better in the next 2 days.  Know when to call  911. Emergency warning signs include:  Trouble breathing or shortness of breath  Pain or pressure in the chest that doesn't go away  Feeling confused like you haven't felt before, or not being able to wake up  Bluish-colored lips or face.  Where can I get more information?  [facility] -- About COVID-19: [email or web address]  CDC -- What to Do If You're Sick: www.cdc.gov/coronavirus/2019-ncov/about/steps-when-sick.html  CDC -- Ending Home Isolation: www.cdc.gov/coronavirus/2019-ncov/hcp/disposition-in-home-patients.html  [facility] -- Caring for Someone: www.cdc.gov/coronavirus/2019-ncov/if-you-are-sick/care-for-someone.html  [facility] -- Interim Guidance for Hospital Discharge to Home: www.[facility].[place]./diseases/coronavirus/hcp/hospdischarge.pdf  [facility] clinical trials (COVID-19 research studies): clinicalaffairs.[facility]-clinical-trials  Below are the COVID-19 hotlines at the [place] Department of Health ([facility]). Interpreters are available.  For health questions: Call 072-020-5873 or 1-263.541.2993 (7 a.m. to 7 p.m.)  For questions about schools and childcare: Call 430-602-3563 or 1-440.308.2318 (7 a.m. to 7 p.m.)       Diagnosis: Acute pharyngitis due to other specified organisms  Diagnosis ICD: J02.8

## 2020-12-21 LAB
SARS-COV-2 RNA SPEC QL NAA+PROBE: ABNORMAL
SPECIMEN SOURCE: ABNORMAL

## 2020-12-22 ENCOUNTER — TELEPHONE (OUTPATIENT)
Dept: EMERGENCY MEDICINE | Facility: CLINIC | Age: 60
End: 2020-12-22

## 2020-12-22 NOTE — TELEPHONE ENCOUNTER
"Coronavirus (COVID-19) Notification    Caller Name (Patient, parent, daughter/son, grandparent, etc)  Esdras Snider  calling in for results    Reason for call  Notify of Positive Coronavirus (COVID-19) lab results, assess symptoms,  review  [employer] recommendations    Lab Result    Lab test:  2019-nCoV rRt-PCR or SARS-CoV-2 PCR    Oropharyngeal AND/OR nasopharyngeal swabs is POSITIVE for 2019-nCoV RNA/SARS-COV-2 PCR (COVID-19 virus)    RN Recommendations/Instructions per [facility] Coronavirus COVID-19 recommendations    Brief introduction script  Introduce self then review script:  \"I am calling on behalf of [employer].  We were notified that your Coronavirus test (COVID-19) for was POSITIVE for the virus.  I have some information to relay to you but first I wanted to mention that the [place] Dept of Health will be contacting you shortly [it's possible MD already called Patient] to talk to you more about how you are feeling and other people you have had contact with who might now also have the virus.  Also,  [employer] is Partnering with the [facility] for Covid-19 research, you may be contacted directly by research staff.\"    Assessment (Inquire about Patient's current symptoms)   Assessment   Current Symptoms at time of phone call: (if no symptoms, document No symptoms] Chest tightness, no sense of taste or smell, slight headache     Symptoms onset (if applicable) 12/17/20     If at time of call, Patients symptoms hare worsened, the Patient should contact 911 or have someone drive them to Emergency Dept promptly:      If Patient calling 911, inform 911 personal that you have tested positive for the Coronavirus (COVID-19).  Place mask on and await 911 to arrive.    If Emergency Dept, If possible, please have another adult drive you to the Emergency Dept but you need to wear mask when in contact with other people.      Monoclonal Antibody Administration    You may be eligible to receive " "a new treatment with a monoclonal antibody for preventing hospitalization in patients at high risk for complications from COVID-19.   This medication is still experimental and available on a limited basis; it is given through an IV and must be given at an infusion center. Please note that not all people who are eligible will receive the medication since it is in limited supply.     Are you interested in being considered for this medication?  No.   Does the patient fit the criteria: No    If patient qualifies based on above criteria:  \"We will contact you if you are selected to receive the medication in the next 1-2 days.   This is time sensitive and if you are not selected in the next 1-2 days, you will not receive the medication.  If you do not receive a call to schedule, you have not been selected.\"    Review information with Patient    Your result was positive. This means you have COVID-19 (coronavirus).  We have sent you a letter that reviews the information that I'll be reviewing with you now.    How can I protect others?    If you have symptoms: stay home and away from others (self-isolate) until:    You've had no fever--and no medicine that reduces fever--for 1 full day (24 hours). And       Your other symptoms have gotten better. For example, your cough or breathing has improved. And     At least 10 days have passed since your symptoms started. (If you've been told by a doctor that you have a weak immune system, wait 20 days.)     If you don't have symptoms: Stay home and away from others (self-isolate) until at least 10 days have passed since your first positive COVID-19 test. (Date test collected)    During this time:    Stay in your own room, including for meals. Use your own bathroom if you can.    Stay away from others in your home. No hugging, kissing or shaking hands. No visitors.     Don't go to work, school or anywhere else.     Clean  high touch  surfaces often (doorknobs, counters, handles, etc.). " Use a household cleaning spray or wipes. You'll find a full list on the EPA website at www.epa.gov/pesticide-registration/list-n-disinfectants-use-against-sars-cov-2.     Cover your mouth and nose with a mask, tissue or other face covering to avoid spreading germs.    Wash your hands and face often with soap and water.    Caregivers in these groups are at risk for severe illness due to COVID-19:  o People 65 years and older  o People who live in a nursing home or long-term care facility  o People with chronic disease (lung, heart, cancer, diabetes, kidney, liver, immunologic)  o People who have a weakened immune system, including those who:  - Are in cancer treatment  - Take medicine that weakens the immune system, such as corticosteroids  - Had a bone marrow or organ transplant  - Have an immune deficiency  - Have poorly controlled HIV or AIDS  - Are obese (body mass index of 40 or higher)  - Smoke regularly    Caregivers should wear gloves while washing dishes, handling laundry and cleaning bedrooms and bathrooms.    Wash and dry laundry with special caution. Don't shake dirty laundry, and use the warmest water setting you can.    If you have a weakened immune system, ask your doctor about other actions you should take.    For more tips, go to www.cdc.gov/coronavirus/2019-ncov/downloads/10Things.pdf.    You should not go back to work until you meet the guidelines above for ending your home isolation. You don't need to be retested for COVID-19 before going back to work--studies show that you won't spread the virus if it's been at least 10 days since your symptoms started (or 20 days, if you have a weak immune system).    Employers: This document serves as formal notice of your employee's medical guidelines for going back to work. They must meet the above guidelines before going back to work in person.    How can I take care of myself?    1. Get lots of rest. Drink extra fluids (unless a doctor has told you not  to).    2. Take Tylenol (acetaminophen) for fever or pain. If you have liver or kidney problems, ask your family doctor if it's okay to take Tylenol.     Take either:     650 mg (two 325 mg pills) every 4 to 6 hours, or     1,000 mg (two 500 mg pills) every 8 hours as needed.     Note: Don't take more than 3,000 mg in one day. Acetaminophen is found in many medicines (both prescribed and over-the-counter medicines). Read all labels to be sure you don't take too much.    For children, check the Tylenol bottle for the right dose (based on their age or weight).    3. If you have other health problems (like cancer, heart failure, an organ transplant or severe kidney disease): Call your specialty clinic if you don't feel better in the next 2 days.    4. Know when to call 911: Emergency warning signs include:    Trouble breathing or shortness of breath    Pain or pressure in the chest that doesn't go away    Feeling confused like you haven't felt before, or not being able to wake up    Bluish-colored lips or face    5. Sign up for [email or web address]. We know it's scary to hear that you have COVID-19. We want to track your symptoms to make sure you're okay over the next 2 weeks. Please look for an email from [employer]--this is a free, online program that we'll use to keep in touch. To sign up, follow the link in the email. Learn more at [email or web address].    Where can I get more information?    [facility]: [email or web address]    Coronavirus Basics: www.health.[facility].[place]/diseases/coronavirus/basics.html    What to Do If You're Sick: www.cdc.gov/coronavirus/2019-ncov/about/steps-when-sick.html    Ending Home Isolation: www.cdc.gov/coronavirus/2019-ncov/hcp/disposition-in-home-patients.html     Caring for Someone with COVID-19: www.cdc.gov/coronavirus/2019-ncov/if-you-are-sick/care-for-someone.html     [facility] clinical trials (COVID-19 research studies):  clinicalaffairs.[facility]-clinical-trials     A Positive COVID-19 letter will be sent via [employer] or the mail. (Exception, no letters sent to Presurgerical/Preprocedure Patients)    Meredith Guerrero LPN

## 2021-02-11 ENCOUNTER — TELEPHONE (OUTPATIENT)
Dept: FAMILY MEDICINE | Facility: CLINIC | Age: 61
End: 2021-02-11

## 2021-02-11 NOTE — LETTER
February 11, 2021      Esdras Snider  [address]  [place] [address]          Dear Esdras Snider, [id number]    At [facility] we care about your health and are committed to providing quality patient care, which includes staying current on preventative cancer screenings.  You can increase your chances of finding and treating cancers through regular screenings.      Our records show that you are due for the following screening(s):      Colonoscopy for colon cancer - Call [phone] to schedule   Recommended every ten years for everyone age 50 and older  We strongly urge our patient's to consider having a colonoscopy done, which is the best screening test available and only needs to be done every 10 years if normal.      If you have a My-Chart Account, you also can schedule this appointment through there.    If you have already had one or all of the above screening tests at another facility, please call us so that we may update your chart.      Your partners in health,      Quality Committee   [facility]

## 2021-02-11 NOTE — TELEPHONE ENCOUNTER
Patient Quality Outreach Summary      Summary:    Patient is due/failing the following:   Colonoscopy    Type of outreach:    Sent letter.    Questions for provider review:    None                                                                                                                    Krystle Mittal on 2/11/2021 at 12:15 PM       Chart routed to none.

## 2021-03-30 DIAGNOSIS — L70.0 ACNE VULGARIS: ICD-10-CM

## 2021-03-30 RX ORDER — MINOCYCLINE HYDROCHLORIDE 100 MG/1
100 TABLET ORAL 2 TIMES DAILY
Qty: 60 TABLET | Refills: 2 | OUTPATIENT
Start: 2021-03-30

## 2021-03-30 NOTE — TELEPHONE ENCOUNTER
"Requested Prescriptions   Pending Prescriptions Disp Refills     minocycline (DYNACIN) 100 MG tablet 60 tablet 2     Sig: Take 1 tablet (100 mg) by mouth 2 times daily       Oral Acne/Rosacea Medications Protocol Failed - 3/30/2021 12:14 PM        Failed - Confirmation of diagnosis is required     Please confirm diagnosis is acne or rosacea.     If NOT acne or rosacea; refer request to provider for further evaluation.    If diagnosis IS acne or rosacea, OK to refill BASED ON PREVIOUS REFILL CLINICAL NOTE RECOMMENDATION.          Passed - Patient is 12 years of age or older        Passed - Recent (12 mo) or future (30 days) visit within the authorizing provider's specialty     Patient has had an office visit with the authorizing provider or a provider within the authorizing providers department within the previous 12 mos or has a future within next 30 days. See \"Patient Info\" tab in inbasket, or \"Choose Columns\" in Meds & Orders section of the refill encounter.              Passed - Medication is active on med list             "

## 2021-03-30 NOTE — TELEPHONE ENCOUNTER
Patient needs appointment for evaluation prior to refills.  Sherice Alfredo NP on 3/30/2021 at 2:11 PM

## 2021-07-15 ENCOUNTER — VIRTUAL VISIT (OUTPATIENT)
Dept: FAMILY MEDICINE | Facility: CLINIC | Age: 61
End: 2021-07-15
Payer: COMMERCIAL

## 2021-07-15 DIAGNOSIS — K51.30 ULCERATIVE RECTOSIGMOIDITIS WITHOUT COMPLICATION (H): ICD-10-CM

## 2021-07-15 DIAGNOSIS — M54.50 CHRONIC BILATERAL LOW BACK PAIN WITHOUT SCIATICA: Primary | ICD-10-CM

## 2021-07-15 DIAGNOSIS — G89.29 CHRONIC BILATERAL LOW BACK PAIN WITHOUT SCIATICA: Primary | ICD-10-CM

## 2021-07-15 DIAGNOSIS — L70.0 ACNE VULGARIS: ICD-10-CM

## 2021-07-15 PROCEDURE — 99214 OFFICE O/P EST MOD 30 MIN: CPT | Mod: 95 | Performed by: NURSE PRACTITIONER

## 2021-07-15 RX ORDER — MINOCYCLINE HYDROCHLORIDE 100 MG/1
100 TABLET ORAL 2 TIMES DAILY
Qty: 60 TABLET | Refills: 0 | Status: SHIPPED | OUTPATIENT
Start: 2021-07-15 | End: 2022-03-03

## 2021-07-15 RX ORDER — METHYLPREDNISOLONE 4 MG
TABLET, DOSE PACK ORAL
Qty: 21 TABLET | Refills: 0 | Status: SHIPPED | OUTPATIENT
Start: 2021-07-15 | End: 2022-03-02

## 2021-07-15 NOTE — PATIENT INSTRUCTIONS
Thank you for choosing [facility].  You may be receiving an email and/or telephone survey request from [facility] Customer Experience regarding your visit today.  Please take a few minutes to respond to the survey to let us know how we are doing.      If you have questions or concerns, please contact us via [employer] or you can contact your care team at [phone] option [phone].    Our Clinic hours are:  Monday - Thursday 7am-6pm  Friday 7am-5pm    The [place] outpatient lab hours are:  Monday - Friday 7am-4:30pm    Appointments are required, call [phone]    If you have clinical questions after hours or would like to schedule an appointment,  call the clinic at [phone].

## 2021-07-15 NOTE — PROGRESS NOTES
Esdras is a 61 year old who is being evaluated via a billable telephone visit.      What phone number would you like to be contacted at? [phone]  How would you like to obtain your AVS? Mail a copy    Assessment & Plan     Ulcerative rectosigmoiditis without complication (H)  Stable well controlled    Chronic bilateral low back pain without sciatica  Has used prednisone in the past with good relief   - Refilled methylPREDNISolone (MEDROL DOSEPAK) 4 MG tablet therapy pack; Follow Package Directions    Acne vulgaris  Patient reports working well for his acne  - Refilled minocycline (DYNACIN) 100 MG tablet; Take 1 tablet (100 mg) by mouth 2 times daily    956}           No follow-ups on file.    GRABIEL [facility]    Raghu Dewitt is a 61 year old who presents for the following health issues  accompanied by himself:    HPI     Obesity:   Wt Readings from Last 4 Encounters:   12/10/20 118.3 kg (260 lb 12.8 oz)   01/20/20 123.6 kg (272 lb 6.4 oz)   01/16/20 124.7 kg (275 lb)   09/01/19 127 kg (280 lb)       Ulcerative colitis without complication. Well controlled-       Medication Followup of back acne-Minocycline     Taking Medication as prescribed: yes    Side Effects:  None    Medication Helping Symptoms:  Yes, this worked very well and would like a refill of the medication.      Back Pain  Would like a refill of the Prednisone that was prescribed in December for the back pain. Worked well   Onset/Duration: 1 week for current symptoms again.  Description:   Location of pain: low back middle area.  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  Pain interferes with job:  no   History of back problems: recurrent self limited episodes of low back pain in the past  Any previous MRI or  X-rays: None  Sees a specialist for back pain: No  Alleviating factors:   Improved by: Ibuprofen.    Precipitating factors:  Worsened by: Sitting and bending over.  Therapies tried and outcome: Ibuprofen taking 3 in the am and helps.    Accompanying Signs & Symptoms:  Risk of Fracture: None  Risk of Cauda Equina: None  Risk of Infection: None  Risk of Cancer: None  Risk of Ankylosing Spondylitis: Onset at age <35, male, AND morning back stiffness no    Review of Systems   Constitutional, HEENT, cardiovascular, pulmonary, GI, , musculoskeletal, neuro, skin, endocrine and psych systems are negative, except as otherwise noted.      Objective           Vitals:  No vitals were obtained today due to virtual visit.    Physical Exam   healthy, alert and no distress  PSYCH: Alert and oriented times 3; coherent speech, normal   rate and volume, able to articulate logical thoughts, able   to abstract reason, no tangential thoughts, no hallucinations   or delusions  His affect is normal  RESP: No cough, no audible wheezing, able to talk in full sentences  Remainder of exam unable to be completed due to telephone visits                Phone call duration: 7 minutes

## 2022-01-18 RX ORDER — MINOCYCLINE HYDROCHLORIDE 100 MG/1
CAPSULE ORAL
Qty: 60 CAPSULE | Refills: 0 | OUTPATIENT
Start: 2022-01-18

## 2022-01-18 NOTE — TELEPHONE ENCOUNTER
Routing refill request to provider for review/approval because:  Needs diagnosis in chart    Oral Acne/Rosacea Medications Protocol Failed 01/13/2022 08:32 AM   Protocol Details  Confirmation of diagnosis is required    Patient is 12 years of age or older    Recent (12 mo) or future (30 days) visit within the authorizing provider's specialty    Medication is active on med list     Pending Prescriptions:                       Disp   Refills    minocycline (MINOCIN) 100 MG capsule [Phar*60 cap*0        Sig: TAKE ONE CAPSULE BY MOUTH TWICE A DAY    Linda Dewitt RN on 1/18/2022 at 10:35 AM

## 2022-01-29 ENCOUNTER — HEALTH MAINTENANCE LETTER (OUTPATIENT)
Age: 62
End: 2022-01-29

## 2022-03-02 ASSESSMENT — ENCOUNTER SYMPTOMS
MYALGIAS: 1
PALPITATIONS: 0
WEAKNESS: 0
NAUSEA: 0
EYE PAIN: 0
PARESTHESIAS: 0
FREQUENCY: 0
CHILLS: 0
DYSURIA: 0
ABDOMINAL PAIN: 0
SHORTNESS OF BREATH: 0
HEARTBURN: 0
DIARRHEA: 0
HEMATURIA: 0
SORE THROAT: 0
DIZZINESS: 0
ARTHRALGIAS: 1
COUGH: 0
CONSTIPATION: 0
NERVOUS/ANXIOUS: 0
HEMATOCHEZIA: 0
HEADACHES: 0
JOINT SWELLING: 0
FEVER: 0

## 2022-03-03 ENCOUNTER — OFFICE VISIT (OUTPATIENT)
Dept: FAMILY MEDICINE | Facility: CLINIC | Age: 62
End: 2022-03-03
Payer: COMMERCIAL

## 2022-03-03 VITALS
OXYGEN SATURATION: 97 % | HEIGHT: 71 IN | BODY MASS INDEX: 36.13 KG/M2 | DIASTOLIC BLOOD PRESSURE: 84 MMHG | SYSTOLIC BLOOD PRESSURE: 130 MMHG | TEMPERATURE: 97.8 F | HEART RATE: 95 BPM | RESPIRATION RATE: 18 BRPM | WEIGHT: 258.1 LBS

## 2022-03-03 DIAGNOSIS — Z11.59 NEED FOR HEPATITIS C SCREENING TEST: ICD-10-CM

## 2022-03-03 DIAGNOSIS — L70.0 ACNE VULGARIS: ICD-10-CM

## 2022-03-03 DIAGNOSIS — Z13.220 SCREENING FOR HYPERLIPIDEMIA: ICD-10-CM

## 2022-03-03 DIAGNOSIS — L98.9 SKIN LESION: ICD-10-CM

## 2022-03-03 DIAGNOSIS — Z12.5 SCREENING FOR PROSTATE CANCER: ICD-10-CM

## 2022-03-03 DIAGNOSIS — Z23 ENCOUNTER FOR IMMUNIZATION: ICD-10-CM

## 2022-03-03 DIAGNOSIS — Z11.4 SCREENING FOR HIV (HUMAN IMMUNODEFICIENCY VIRUS): ICD-10-CM

## 2022-03-03 DIAGNOSIS — Z12.11 SCREEN FOR COLON CANCER: ICD-10-CM

## 2022-03-03 DIAGNOSIS — Z00.00 ROUTINE GENERAL MEDICAL EXAMINATION AT A HEALTH CARE FACILITY: Primary | ICD-10-CM

## 2022-03-03 DIAGNOSIS — E78.5 HYPERLIPIDEMIA LDL GOAL <160: Primary | ICD-10-CM

## 2022-03-03 DIAGNOSIS — R73.01 ELEVATED FASTING BLOOD SUGAR: ICD-10-CM

## 2022-03-03 LAB
CHOLEST SERPL-MCNC: 238 MG/DL
FASTING STATUS PATIENT QL REPORTED: YES
FASTING STATUS PATIENT QL REPORTED: YES
GLUCOSE BLD-MCNC: 106 MG/DL (ref 70–99)
HCV AB SERPL QL IA: NONREACTIVE
HDLC SERPL-MCNC: 48 MG/DL
HIV 1+2 AB+HIV1 P24 AG SERPL QL IA: NONREACTIVE
LDLC SERPL CALC-MCNC: 172 MG/DL
NONHDLC SERPL-MCNC: 190 MG/DL
PSA SERPL-MCNC: 0.92 UG/L (ref 0–4)
TRIGL SERPL-MCNC: 89 MG/DL

## 2022-03-03 PROCEDURE — 99213 OFFICE O/P EST LOW 20 MIN: CPT | Mod: 25 | Performed by: NURSE PRACTITIONER

## 2022-03-03 PROCEDURE — 86803 HEPATITIS C AB TEST: CPT | Performed by: NURSE PRACTITIONER

## 2022-03-03 PROCEDURE — G0103 PSA SCREENING: HCPCS | Performed by: NURSE PRACTITIONER

## 2022-03-03 PROCEDURE — 36415 COLL VENOUS BLD VENIPUNCTURE: CPT | Performed by: NURSE PRACTITIONER

## 2022-03-03 PROCEDURE — 90715 TDAP VACCINE 7 YRS/> IM: CPT | Performed by: NURSE PRACTITIONER

## 2022-03-03 PROCEDURE — 90471 IMMUNIZATION ADMIN: CPT | Performed by: NURSE PRACTITIONER

## 2022-03-03 PROCEDURE — 80061 LIPID PANEL: CPT | Performed by: NURSE PRACTITIONER

## 2022-03-03 PROCEDURE — 99396 PREV VISIT EST AGE 40-64: CPT | Mod: 25 | Performed by: NURSE PRACTITIONER

## 2022-03-03 PROCEDURE — 82947 ASSAY GLUCOSE BLOOD QUANT: CPT | Performed by: NURSE PRACTITIONER

## 2022-03-03 PROCEDURE — 87389 HIV-1 AG W/HIV-1&-2 AB AG IA: CPT | Performed by: NURSE PRACTITIONER

## 2022-03-03 RX ORDER — MINOCYCLINE HYDROCHLORIDE 100 MG/1
100 TABLET ORAL 2 TIMES DAILY
Qty: 180 TABLET | Refills: 0 | Status: SHIPPED | OUTPATIENT
Start: 2022-03-03 | End: 2022-03-03

## 2022-03-03 RX ORDER — MINOCYCLINE HYDROCHLORIDE 100 MG/1
100 TABLET ORAL 2 TIMES DAILY
Qty: 180 TABLET | Refills: 0 | Status: SHIPPED | OUTPATIENT
Start: 2022-03-03 | End: 2022-11-07 | Stop reason: ALTCHOICE

## 2022-03-03 ASSESSMENT — ENCOUNTER SYMPTOMS
NAUSEA: 0
NERVOUS/ANXIOUS: 0
WEAKNESS: 0
HEMATOCHEZIA: 0
ARTHRALGIAS: 1
DIZZINESS: 0
FEVER: 0
SHORTNESS OF BREATH: 0
JOINT SWELLING: 0
MYALGIAS: 1
SORE THROAT: 0
COUGH: 0
PARESTHESIAS: 0
PALPITATIONS: 0
CHILLS: 0
ABDOMINAL PAIN: 0
HEADACHES: 0
FREQUENCY: 0
EYE PAIN: 0
DIARRHEA: 0
HEMATURIA: 0
CONSTIPATION: 0
HEARTBURN: 0
DYSURIA: 0

## 2022-03-03 ASSESSMENT — PAIN SCALES - GENERAL: PAINLEVEL: NO PAIN (0)

## 2022-03-03 NOTE — PROGRESS NOTES
SUBJECTIVE:   CC: Esdras Snider is an 62 year old male who presents for preventative health visit.       Patient has been advised of split billing requirements and indicates understanding: Yes  Healthy Habits:     Getting at least 3 servings of Calcium per day:  Yes    Bi-annual eye exam:  NO    Dental care twice a year:  Yes    Sleep apnea or symptoms of sleep apnea:  None    Diet:  Other    Frequency of exercise:  2-3 days/week    Duration of exercise:  45-60 minutes    Taking medications regularly:  Yes    Barriers to taking medications:  None    Medication side effects:  None    PHQ-2 Total Score: 0    Additional concerns today:  Yes    Eye check every time he gets new glasses. Careful with what he eats since he is trying to lose weight.    Medication Followup of minocycline    Taking Medication as prescribed: yes    Side Effects:  None    Medication Helping Symptoms:  Yes when he has a break out.      Patient also has a couple benign lesions on the right jaw line that he cuts when shaving and would like these removed.    Today's PHQ-2 Score:   PHQ-2 ( 1999 [employer]) 3/2/2022   Q1: Little interest or pleasure in doing things 0   Q2: Feeling down, depressed or hopeless 0   PHQ-2 Score 0   PHQ-2 Total Score (12-17 Years)- Positive if 3 or more points; Administer PHQ-A if positive -   Q1: Little interest or pleasure in doing things Not at all   Q2: Feeling down, depressed or hopeless Not at all   PHQ-2 Score 0     Abuse: Current or Past(Physical, Sexual or Emotional)- No  Do you feel safe in your environment? Yes    Have you ever done Advance Care Planning? (For example, a Health Directive, POLST, or a discussion with a medical provider or your loved ones about your wishes): No, advance care planning information given to patient to review.  Patient plans to discuss their wishes with loved ones or provider.      Social History     Tobacco Use     Smoking status: Former Smoker     Smokeless tobacco: Never Used    Substance Use Topics     Alcohol use: Yes     Comment: Social use.     If you drink alcohol do you typically have >3 drinks per day or >7 drinks per week? No    Alcohol Use 3/3/2022   Prescreen: >3 drinks/day or >7 drinks/week? -   Prescreen: >3 drinks/day or >7 drinks/week? No       Last PSA: No results found for: PSA    Reviewed orders with patient. 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 No pertinent surgical history.    Social History     Tobacco Use     Smoking status: Former Smoker     Smokeless tobacco: Never Used   Substance Use Topics     Alcohol use: Yes     Comment: Social use.     Family History   Problem Relation Age of Onset     Diabetes Maternal Grandmother      Crohn's Disease Son          Current Outpatient Medications   Medication Sig Dispense Refill     minocycline (DYNACIN) 100 MG tablet Take 1 tablet (100 mg) by mouth 2 times daily 180 tablet 0     No Known Allergies    Reviewed and updated as needed this visit by clinical staff   Tobacco  Allergies  Meds   Med Hx  Surg Hx  Fam Hx  Soc Hx        Reviewed and updated as needed this visit by Provider   Tobacco  Allergies    Med Hx  Surg Hx  Fam Hx  Soc Hx       Past Medical History:   Diagnosis Date     Acne      Ulcerative colitis (H)       History reviewed. No pertinent surgical history.    Review of Systems   Constitutional: Negative for chills and fever.   HENT: Positive for hearing loss. Negative for congestion, ear  "pain and sore throat.    Eyes: Negative for pain and visual disturbance.   Respiratory: Negative for cough and shortness of breath.    Cardiovascular: Negative for chest pain, palpitations and peripheral edema.   Gastrointestinal: Negative for abdominal pain, constipation, diarrhea, heartburn, hematochezia and nausea.   Genitourinary: Negative for dysuria, frequency, genital sores, hematuria, impotence, penile discharge and urgency.   Musculoskeletal: Positive for arthralgias and myalgias. Negative for joint swelling.   Skin: Negative for rash.   Neurological: Negative for dizziness, weakness, headaches and paresthesias.   Psychiatric/Behavioral: Negative for mood changes. The patient is not nervous/anxious.        OBJECTIVE:   /84 (BP Location: Right arm, Patient Position: Sitting, Cuff Size: Adult Large)   Pulse 95   Temp 97.8  F (36.6  C) (Tympanic)   Resp 18   Ht 1.803 m (5' 11\")   Wt 117.1 kg (258 lb 1.6 oz)   SpO2 97%   BMI 36.00 kg/m      Physical Exam  GENERAL: healthy, alert and no distress  EYES: Eyes grossly normal to inspection, PERRL and conjunctivae and sclerae normal  HENT: ear canals and TM's normal, nose and mouth without ulcers or lesions  NECK: no adenopathy, no asymmetry, masses, or scars and thyroid normal to palpation  RESP: lungs clear to auscultation - no rales, rhonchi or wheezes  CV: regular rate and rhythm, normal S1 S2, no S3 or S4, no murmur, click or rub, no peripheral edema and peripheral pulses strong  ABDOMEN: soft, nontender, no hepatosplenomegaly, no masses and bowel sounds normal  MS: no gross musculoskeletal defects noted, no edema  SKIN: flesh colored papular lesions x 2 on right jaw line approximately 4-5 mm and 3 mm in diameter and acne scaring on back with some mild irritated areas in central back between the shoulder blades that area scabbed.  NEURO: Normal strength and tone, mentation intact and speech normal  PSYCH: mentation appears normal, affect " normal/bright  LYMPH: no cervical adenopathy    Diagnostic Test Results:  Labs reviewed in Epic  Labs ordered    ASSESSMENT/PLAN:   (Z00.00) Routine general medical examination at a health care facility  (primary encounter diagnosis)  Comment: Labs for screening today.  Patient will be notified through [employer] for results and plan.  Referral for colonoscopy made today.  Recommend follow-up in 1 year for preventative exam.  Plan: REVIEW OF HEALTH MAINTENANCE PROTOCOL ORDERS,         Adult Gastro Ref - Procedure Only, HIV Antigen         Antibody Combo, Hepatitis C Screen Reflex to         HCV RNA Quant and Genotype, Lipid panel reflex         to direct LDL Fasting, GLUCOSE, PSA, screen    (Z12.11) Screen for colon cancer  Plan: Adult Gastro Ref - Procedure Only    (Z11.4) Screening for HIV (human immunodeficiency virus)  Plan: HIV Antigen Antibody Combo    (Z11.59) Need for hepatitis C screening test  Plan: Hepatitis C Screen Reflex to HCV RNA Quant and         Genotype    (Z13.220) Screening for hyperlipidemia  Plan: Lipid panel reflex to direct LDL Fasting    (Z12.5) Screening for prostate cancer  Plan: PSA, screen    (Z23) Encounter for immunization  Plan: TDAP VACCINE (Adacel, Boostrix)  [[id number]]    (L70.0) Acne vulgaris  Comment: Patient has good clearance of his acne at this time.  Discussed trying to get some benzoyl peroxide to the back but he is single and has no one to help him apply.  Ordered one time minocycline for 3 months if he gets a flare again, advised he would need to follow-up for labs after use if he does take this.  Plan: minocycline (DYNACIN) 100 MG tablet,         DISCONTINUED: minocycline (DYNACIN) 100 MG         tablet    (L98.9) Skin lesion  Comment: Referral to dermatology for evaluation and removal of benign lesions.  Plan: Adult Dermatology Referral    Patient has been advised of split billing requirements and indicates understanding: Yes    COUNSELING:   Reviewed preventive health  "counseling, as reflected in patient instructions       Regular exercise       Healthy diet/nutrition       Vision screening       Immunizations    Vaccinated for: TDAP             Consider Hep C screening for all patients one time for ages 18-79 years       HIV screeninx in teen years, 1x in adult years, and at intervals if high risk       Colorectal cancer screening       Prostate cancer screening    Estimated body mass index is 36 kg/m  as calculated from the following:    Height as of this encounter: 1.803 m (5' 11\").    Weight as of this encounter: 117.1 kg (258 lb 1.6 oz).     Weight management plan: Discussed healthy diet and exercise guidelines    He reports that he has quit smoking. He has never used smokeless tobacco.    Sherice Alfredo NP  [facility]  "

## 2022-03-03 NOTE — PATIENT INSTRUCTIONS
Preventive Health Recommendations  Male Ages 50 - 64    Yearly exam:             See your health care provider every year in order to  o   Review health changes.   o   Discuss preventive care.    o   Review your medicines if your doctor has prescribed any.     Have a cholesterol test every 5 years, or more frequently if you are at risk for high cholesterol/heart disease.     Have a diabetes test (fasting glucose) every three years. If you are at risk for diabetes, you should have this test more often.     Have a colonoscopy at age 50, or have a yearly FIT test (stool test). These exams will check for colon cancer.      Talk with your health care provider about whether or not a prostate cancer screening test (PSA) is right for you.    You should be tested each year for STDs (sexually transmitted diseases), if you re at risk.     Shots: Get a flu shot each year. Get a tetanus shot every 10 years.     Nutrition:    Eat at least 5 servings of fruits and vegetables daily.     Eat whole-grain bread, whole-wheat pasta and brown rice instead of white grains and rice.     Get adequate Calcium and Vitamin D.     Lifestyle    Exercise for at least 150 minutes a week (30 minutes a day, 5 days a week). This will help you control your weight and prevent disease.     Limit alcohol to one drink per day.     No smoking.     Wear sunscreen to prevent skin cancer.     See your dentist every six months for an exam and cleaning.     See your eye doctor every 1 to 2 years.    Use Glucosamine Chondritin supplement for joint lubrication daily.  Shingrex vaccination for shingles (2 shots)  Make sure insurance pays for this.  Make an appointment for colonoscopy.  Lab today.  I will notify you on MyChart of results.  Tetanus vaccination today  If you start Minocycline and take for 3 months, follow-up in clinic for labs.  Dermatology referral placed to discuss skin lesion on face for removal.

## 2022-03-07 ENCOUNTER — TELEPHONE (OUTPATIENT)
Dept: FAMILY MEDICINE | Facility: CLINIC | Age: 62
End: 2022-03-07
Payer: COMMERCIAL

## 2022-03-07 NOTE — TELEPHONE ENCOUNTER
Reason for call:  Patient reporting a symptom    Symptom or request: Pt saw NAVEED Alfredo in clinic last week and pt calling NAVEED Alfredo back to discuss abnormal lab results.  Please call patient and advise.        Duration (how long have symptoms been present): ongoing    Have you been treated for this before? Yes    Additional comments:     Phone Number patient can be reached at:  Home number on file [phone] (home)    Best Time:  any    Can we leave a detailed message on this number:  YES    Call taken on 3/7/2022 at 8:11 AM by Ronit Almaraz

## 2022-03-07 NOTE — TELEPHONE ENCOUNTER
Called patient and answered questions about labs and exposure to Herpes.  Patient verbalized understanding of information.  Sherice Alfredo NP on 3/7/2022 at 9:55 AM

## 2022-03-10 ENCOUNTER — OFFICE VISIT (OUTPATIENT)
Dept: FAMILY MEDICINE | Facility: CLINIC | Age: 62
End: 2022-03-10
Payer: COMMERCIAL

## 2022-03-10 VITALS
HEIGHT: 71 IN | SYSTOLIC BLOOD PRESSURE: 138 MMHG | TEMPERATURE: 97.6 F | WEIGHT: 257 LBS | BODY MASS INDEX: 35.98 KG/M2 | DIASTOLIC BLOOD PRESSURE: 88 MMHG | OXYGEN SATURATION: 96 % | RESPIRATION RATE: 14 BRPM | HEART RATE: 84 BPM

## 2022-03-10 DIAGNOSIS — B36.9 FUNGAL SKIN INFECTION: Primary | ICD-10-CM

## 2022-03-10 PROCEDURE — 99213 OFFICE O/P EST LOW 20 MIN: CPT | Performed by: NURSE PRACTITIONER

## 2022-03-10 RX ORDER — KETOCONAZOLE 20 MG/G
CREAM TOPICAL 2 TIMES DAILY
Qty: 30 G | Refills: 1 | Status: SHIPPED | OUTPATIENT
Start: 2022-03-10 | End: 2022-11-07

## 2022-03-10 ASSESSMENT — PAIN SCALES - GENERAL: PAINLEVEL: NO PAIN (0)

## 2022-03-10 NOTE — PATIENT INSTRUCTIONS
1.  Use topical cream twice daily for 2-4 weeks.  2.  Follow-up I 2 weeks if no improvement or if this ulcerates and appears more herpatic.      Patient Education     Fungal Skin Infection (Tinea)  A fungal infection occurs when too much fungus grows on or in the body. Fungus normally lives on the skin in small amounts and does not cause harm. But when too much grows on the skin, it causes an infection. This is also known as tinea. Fungal skin infections are common and not usually serious.   The infection often starts as a small red area the size of a pea. The skin may turn dry and flaky. The area may itch. As the fungus grows, it spreads out in a red [place]. Because of how it looks, fungal skin infection is often called ringworm, but it is not caused by a worm. Fungal skin infections can occur on many parts of the body. They can grow on the head, chest, arms, buttocks or legs. On the feet, fungal infection is known as  athlete s foot.  It causes itchy, sometimes painful sores between the toes and the bottom or sides of the feet. In the groin, the rash is called  jock itch.    People with weak immune systems can get a fungal infection more easily. This includes people with diabetes or HIV, or who are being treated for cancer. In these cases, the fungal infection can spread and cause severe illness. Fungal infections are also more common in people who are overweight.   In most cases, treatment is done with antifungal cream or ointment. If the infection is on your scalp, you will need to take oral medicine. To confirm the diagnosis of a fungal infection, the healthcare provider may take a small scraping of the skin to be tested in a lab.   Common fungal infections are treated with creams on the skin or oral medicine.  Home care  Follow all instructions when using antifungal cream or ointment on your skin.   General care:    If you were prescribed an oral medicine, read the patient information. Talk with your  healthcare provider about the risks and side effects.    Let your skin dry completely after bathing. Carefully dry your feet and between your toes.    Dress in loose cotton clothing.    Don t scratch the affected area. This can delay healing and may spread the infection. It can also cause a bacterial infection.    Keep your skin clean, but don t wash the skin too much. This can irritate your skin.    Keep in mind that it may take a week before the fungus starts to go away. It can take 2 to 4 weeks to fully clear. To prevent it from coming back, use the medicine until the rash is all gone.  Follow-up care  Follow up with your healthcare provider if the rash does not get better after 10 days of treatment. Also follow up if the rash spreads to other parts of your body.   When to seek medical advice  Call your healthcare provider right away if any of these occur:    Fever of 100.4 F (38 C) or higher, or as directed by your healthcare provider    Redness or swelling that gets worse    Pain that gets worse    Foul-smelling fluid leaking from the skin  [employer] last reviewed this educational content on 8/1/2019 2000-2021 [employer]. All rights reserved. This information is not intended as a substitute for professional medical care. Always follow your healthcare professional's instructions.

## 2022-03-10 NOTE — PROGRESS NOTES
"  Assessment & Plan     Fungal skin infection  Skin lesion is in the groin and is circular with raised edges and appears fungal.  Treating with keoconazole twice daily for 2-4 weeks.  Recommend follow-up if no improvement or change in symptoms.  - ketoconazole (NIZORAL) 2 % external cream; Apply topically 2 times daily    See Patient Instructions    Return in about 2 weeks (around 3/24/2022), or if symptoms worsen or fail to improve.    Sherice Alfredo NP  [facility]    Raghu Dewitt is a 62 year old who presents for the following health issues     History of Present Illness       Reason for visit:  Private matter  Symptom onset:  3-7 days ago  Symptoms include:  Private matter  Symptom intensity:  Moderate  Symptom progression:  Staying the same  Had these symptoms before:  No  What makes it worse:  No  What makes it better:  No    He eats 0-1 servings of fruits and vegetables daily.He consumes 0 sweetened beverage(s) daily.He exercises with enough effort to increase his heart rate 30 to 60 minutes per day.  He exercises with enough effort to increase his heart rate 3 or less days per week.   He is taking medications regularly.     Patient has a lesion in the left groin.  He has had intercourse with someone who has hx of herpes and has concerns with this lesion being herpes.  He states that it tingles but no pain or itching.    Review of Systems   CONSTITUTIONAL: NEGATIVE for fever, chills, change in weight  INTEGUMENTARY/SKIN: NEGATIVE for rash left groin area   RESP: NEGATIVE for significant cough or SOB  CV: NEGATIVE for chest pain, palpitations or peripheral edema  PSYCHIATRIC: NEGATIVE for changes in mood or affect  ROS otherwise negative      Objective    /88 (BP Location: Right arm, Patient Position: Chair, Cuff Size: Adult Large)   Pulse 84   Temp 97.6  F (36.4  C) (Tympanic)   Resp 14   Ht 1.803 m (5' 11\")   Wt 116.6 kg (257 lb)   SpO2 96%   BMI 35.84 kg/m  "   Body mass index is 35.84 kg/m .  Physical Exam   GENERAL: healthy, alert and no distress  SKIN: macule - left groin at the crease of the leg, circular with raised edges and central clearing, no tenderness, no signs of infection  PSYCH: mentation appears normal, affect normal/bright

## 2022-05-09 ENCOUNTER — LAB (OUTPATIENT)
Dept: LAB | Facility: CLINIC | Age: 62
End: 2022-05-09
Payer: COMMERCIAL

## 2022-05-09 DIAGNOSIS — E78.5 HYPERLIPIDEMIA LDL GOAL <160: ICD-10-CM

## 2022-05-09 DIAGNOSIS — R73.01 ELEVATED FASTING BLOOD SUGAR: ICD-10-CM

## 2022-05-09 LAB
CHOLEST SERPL-MCNC: 202 MG/DL
FASTING STATUS PATIENT QL REPORTED: YES
HBA1C MFR BLD: 5.4 % (ref 0–5.6)
HDLC SERPL-MCNC: 34 MG/DL
LDLC SERPL CALC-MCNC: 145 MG/DL
NONHDLC SERPL-MCNC: 168 MG/DL
TRIGL SERPL-MCNC: 114 MG/DL

## 2022-05-09 PROCEDURE — 36415 COLL VENOUS BLD VENIPUNCTURE: CPT

## 2022-05-09 PROCEDURE — 83036 HEMOGLOBIN GLYCOSYLATED A1C: CPT

## 2022-05-09 PROCEDURE — 80061 LIPID PANEL: CPT

## 2022-09-11 ENCOUNTER — HEALTH MAINTENANCE LETTER (OUTPATIENT)
Age: 62
End: 2022-09-11

## 2022-11-07 ENCOUNTER — OFFICE VISIT (OUTPATIENT)
Dept: FAMILY MEDICINE | Facility: CLINIC | Age: 62
End: 2022-11-07
Payer: COMMERCIAL

## 2022-11-07 VITALS
RESPIRATION RATE: 14 BRPM | WEIGHT: 271.8 LBS | OXYGEN SATURATION: 98 % | DIASTOLIC BLOOD PRESSURE: 80 MMHG | HEIGHT: 71 IN | TEMPERATURE: 97.8 F | HEART RATE: 86 BPM | BODY MASS INDEX: 38.05 KG/M2 | SYSTOLIC BLOOD PRESSURE: 122 MMHG

## 2022-11-07 DIAGNOSIS — M79.10 MYALGIA: ICD-10-CM

## 2022-11-07 DIAGNOSIS — G57.62 MORTON'S NEUROMA OF LEFT FOOT: ICD-10-CM

## 2022-11-07 DIAGNOSIS — L70.0 ACNE VULGARIS: ICD-10-CM

## 2022-11-07 DIAGNOSIS — K51.919 ULCERATIVE COLITIS WITH COMPLICATION, UNSPECIFIED LOCATION (H): ICD-10-CM

## 2022-11-07 DIAGNOSIS — N52.9 ERECTILE DYSFUNCTION, UNSPECIFIED ERECTILE DYSFUNCTION TYPE: ICD-10-CM

## 2022-11-07 DIAGNOSIS — Z01.818 PREOP GENERAL PHYSICAL EXAM: Primary | ICD-10-CM

## 2022-11-07 LAB
ALBUMIN SERPL BCG-MCNC: 4.8 G/DL (ref 3.5–5.2)
ALP SERPL-CCNC: 62 U/L (ref 40–129)
ALT SERPL W P-5'-P-CCNC: 26 U/L (ref 10–50)
ANION GAP SERPL CALCULATED.3IONS-SCNC: 9 MMOL/L (ref 7–15)
AST SERPL W P-5'-P-CCNC: 17 U/L (ref 10–50)
BASOPHILS # BLD AUTO: 0.1 10E3/UL (ref 0–0.2)
BASOPHILS NFR BLD AUTO: 1 %
BILIRUB SERPL-MCNC: 0.5 MG/DL
BUN SERPL-MCNC: 13.1 MG/DL (ref 8–23)
CALCIUM SERPL-MCNC: 9.8 MG/DL (ref 8.8–10.2)
CHLORIDE SERPL-SCNC: 103 MMOL/L (ref 98–107)
CREAT SERPL-MCNC: 1.05 MG/DL (ref 0.67–1.17)
DEPRECATED HCO3 PLAS-SCNC: 28 MMOL/L (ref 22–29)
EOSINOPHIL # BLD AUTO: 0.2 10E3/UL (ref 0–0.7)
EOSINOPHIL NFR BLD AUTO: 2 %
ERYTHROCYTE [DISTWIDTH] IN BLOOD BY AUTOMATED COUNT: 12.7 % (ref 10–15)
GFR SERPL CREATININE-BSD FRML MDRD: 81 ML/MIN/1.73M2
GLUCOSE SERPL-MCNC: 92 MG/DL (ref 70–99)
HCT VFR BLD AUTO: 44.1 % (ref 40–53)
HGB BLD-MCNC: 14.7 G/DL (ref 13.3–17.7)
LYMPHOCYTES # BLD AUTO: 2.5 10E3/UL (ref 0.8–5.3)
LYMPHOCYTES NFR BLD AUTO: 30 %
MCH RBC QN AUTO: 29.9 PG (ref 26.5–33)
MCHC RBC AUTO-ENTMCNC: 33.3 G/DL (ref 31.5–36.5)
MCV RBC AUTO: 90 FL (ref 78–100)
MONOCYTES # BLD AUTO: 0.7 10E3/UL (ref 0–1.3)
MONOCYTES NFR BLD AUTO: 8 %
NEUTROPHILS # BLD AUTO: 4.8 10E3/UL (ref 1.6–8.3)
NEUTROPHILS NFR BLD AUTO: 59 %
PLATELET # BLD AUTO: 294 10E3/UL (ref 150–450)
POTASSIUM SERPL-SCNC: 4.1 MMOL/L (ref 3.4–5.3)
PROT SERPL-MCNC: 8 G/DL (ref 6.4–8.3)
RBC # BLD AUTO: 4.91 10E6/UL (ref 4.4–5.9)
SODIUM SERPL-SCNC: 140 MMOL/L (ref 136–145)
TSH SERPL DL<=0.005 MIU/L-ACNC: 1.21 UIU/ML (ref 0.3–4.2)
WBC # BLD AUTO: 8.3 10E3/UL (ref 4–11)

## 2022-11-07 PROCEDURE — 80050 GENERAL HEALTH PANEL: CPT | Performed by: NURSE PRACTITIONER

## 2022-11-07 PROCEDURE — 36415 COLL VENOUS BLD VENIPUNCTURE: CPT | Performed by: NURSE PRACTITIONER

## 2022-11-07 PROCEDURE — 84403 ASSAY OF TOTAL TESTOSTERONE: CPT | Performed by: NURSE PRACTITIONER

## 2022-11-07 PROCEDURE — 99214 OFFICE O/P EST MOD 30 MIN: CPT | Performed by: NURSE PRACTITIONER

## 2022-11-07 RX ORDER — MINOCYCLINE HYDROCHLORIDE 100 MG/1
100 CAPSULE ORAL 2 TIMES DAILY
Qty: 30 CAPSULE | Refills: 3 | Status: SHIPPED | OUTPATIENT
Start: 2022-11-07 | End: 2023-09-08

## 2022-11-07 RX ORDER — MINOCYCLINE HYDROCHLORIDE 100 MG/1
CAPSULE ORAL
COMMUNITY
Start: 2022-03-03 | End: 2022-11-07

## 2022-11-07 ASSESSMENT — PAIN SCALES - GENERAL: PAINLEVEL: NO PAIN (0)

## 2022-11-07 NOTE — PATIENT INSTRUCTIONS
Preparing for Your Surgery  Getting started  A nurse will call you to review your health history and instructions. They will give you an arrival time based on your scheduled surgery time. Please be ready to share:  Your doctor s clinic name and phone number  Your medical, surgical, and anesthesia history  A list of allergies and sensitivities  A list of medicines, including herbal treatments and over-the-counter drugs  Whether the patient has a legal guardian (ask how to send us the papers in advance)  Please tell us if you re pregnant--or if there s any chance you might be pregnant. Some surgeries may injure a fetus (unborn baby), so they require a pregnancy test. Surgeries that are safe for a fetus don t always need a test, and you can choose whether to have one.   If you have a child who s having surgery, please ask for a copy of Preparing for Your Child s Surgery.    Preparing for surgery  Within 10 to 30 days of surgery: Have a pre-op exam (sometimes called an H&P, or History and Physical). This can be done at a clinic or pre-operative center.  If you re having a , you may not need this exam. Talk to your care team.  At your pre-op exam, talk to your care team about all medicines you take. If you need to stop any medicines before surgery, ask when to start taking them again.  We do this for your safety. Many medicines can make you bleed too much during surgery. Some change how well surgery (anesthesia) drugs work.  Call your insurance company to let them know you re having surgery. (If you don t have insurance, call [phone].)  Call your clinic if there s any change in your health. This includes signs of a cold or flu (sore throat, runny nose, cough, rash, fever). It also includes a scrape or scratch near the surgery site.  If you have questions on the day of surgery, call your hospital or surgery center.  COVID testing  You may need to be tested for COVID-19 before having surgery. If so, we will  give you instructions (or click here).  Eating and drinking guidelines  For your safety: Unless your surgeon tells you otherwise, follow the guidelines below.  Eat and drink as usual until 8 hours before you arrive for surgery. After that, no food or milk.  Drink clear liquids until 2 hours before you arrive. These are liquids you can see through, like water, Gatorade, and Propel Water. They also include plain black coffee and tea (no cream or milk), candy, and breath mints. You can spit out gum when you arrive.  If you drink alcohol: Stop drinking it the night before surgery.  If your care team tells you to take medicine on the morning of surgery, it s okay to take it with a sip of water.  Preventing infection  Shower or bathe the night before and morning of your surgery. Follow the instructions your clinic gave you. (If no instructions, use regular soap.)  Don t shave or clip hair near your surgery site. We ll remove the hair if needed.  Don t smoke or vape the morning of surgery. You may chew nicotine gum up to 2 hours before surgery. A nicotine patch is okay.  Note: Some surgeries require you to completely quit smoking and nicotine. Check with your surgeon.  Your care team will make every effort to keep you safe from infection. We will:  Clean our hands often with soap and water (or an alcohol-based hand rub).  Clean the skin at your surgery site with a special soap that kills germs.  Give you a special gown to keep you warm. (Cold raises the risk of infection.)  Wear special hair covers, masks, gowns and gloves during surgery.  Give antibiotic medicine, if prescribed. Not all surgeries need antibiotics.  What to bring on the day of surgery  Photo ID and insurance card  Copy of your health care directive, if you have one  Glasses and hearing aids (bring cases)  You can t wear contacts during surgery  Inhaler and eye drops, if you use them (tell us about these when you arrive)  CPAP machine or breathing device,  if you use them  A few personal items, if spending the night  If you have . . .  A pacemaker, ICD (cardiac defibrillator) or other implant: Bring the ID card.  An implanted stimulator: Bring the remote control.  A legal guardian: Bring a copy of the certified (court-stamped) guardianship papers.  Please remove any jewelry, including body piercings. Leave jewelry and other valuables at home.  If you re going home the day of surgery  You must have a responsible adult drive you home. They should stay with you overnight as well.  If you don t have someone to stay with you, and you aren t safe to go home alone, we may keep you overnight. Insurance often won t pay for this.  After surgery  If it s hard to control your pain or you need more pain medicine, please call your surgeon s office.  Questions?   If you have any questions for your care team, list them here:   ____________________________________________________________________________________________________________________________________________________________________________________________________________________________________________________________________  For informational purposes only. Not to replace the advice of your health care provider. Copyright   2003, 2019 [employer]. All rights reserved. Clinically reviewed by Stephany Hill MD. SMARTworks 997410 - REV 10/22.    How to Take Your Medication Before Surgery  - HOLD (do not take) Aspirin for 7 days  - STOP taking all vitamins and herbal supplements 14 days before surgery.  - No ibuprofen 48 hours before surgery

## 2022-11-07 NOTE — PROGRESS NOTES
[facility]  [address] [facility] [address]  Phone: [phone]  Primary Provider: No Ref-Primary, Physician  Pre-op Performing Provider: KHUSHI VILLA    :[id number]}  PREOPERATIVE EVALUATION:  Today's date: 11/7/2022    Esdras Snider is a 62 year old male who presents for a preoperative evaluation.    Surgical Information:  Surgery/Procedure: left mortens neuroma excision  Surgery Location: [facility] [place] [facility]  Surgeon: dr borges  Surgery Date: 11/21/2022  Time of Surgery: tbd  Where patient plans to recover: At home with family  Fax number for surgical facility: [phone]    Type of Anesthesia Anticipated: General    Assessment & Plan     The proposed surgical procedure is considered INTERMEDIATE risk.    (Z01.818) Preop general physical exam  (primary encounter diagnosis)  Comment: Approval given. EKG and labs not obtained d/t no cardiac history and no renal or chronic medication use.     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 Physical therapy referral placed for strengthening exercises to the upper legs.  Plan: Physical Therapy Referral    (N52.9) Erectile dysfunction, unspecified erectile dysfunction type  Comment: Work up initiated, hgbA1C and lipids in April within normal limits. CBC with diff, CMP, TSH and total testosterone ordered. Patient has taken Cialis in the past which has worked for him and would like again if workup is negative. Will order if labs are negative and education regarding risks to Cialis was discussed with him.     Medication Instructions:  Patient is on no chronic medications   - ibuprofen (Advil, Motrin): HOLD 1 day  before surgery.     RECOMMENDATION:  APPROVAL GIVEN to proceed with proposed procedure, without further diagnostic evaluation.    Subjective     HPI related to upcoming procedure: Left Mortons Neuroma    Preop Questions 11/6/2022   1. Have you ever had a heart attack or stroke? No   2. Have you ever had surgery on your heart or blood vessels, such as a stent placement, a coronary artery bypass, or surgery on an artery in your head, neck, heart, or legs? No   3. Do you have chest pain with activity? No   4. Do you have a history of  heart failure? No   5. Do you currently have a cold, bronchitis or symptoms of other infection? No   6. Do you have a cough, shortness of breath, or wheezing? No   7. Do you or anyone in your family have previous history of blood clots? No   8. Do you or does anyone in your family have a serious bleeding problem such as prolonged bleeding following surgeries or cuts? No   9. Have you ever had problems with anemia or been told to take iron pills? No   10. Have you had any abnormal blood loss such as black, tarry or bloody stools? No   11. Have you ever had a blood transfusion? No   12. Are you willing to have a blood transfusion if it is medically needed before, during, or after your surgery? Yes   13. Have you or any of your relatives ever had problems with anesthesia? No   14. Do you have sleep apnea, excessive snoring or daytime drowsiness? No   15. Do you have any artifical heart valves or other implanted medical devices like a pacemaker, defibrillator, or continuous glucose monitor? No   16. Do you have artificial joints? No   17. Are you allergic to latex? No       Health Care Directive:  Patient does not have a Health Care Directive or Living Will: Discussed advance care planning with patient; information given to patient to review.    Preoperative Review of :   reviewed - no record of controlled substances prescribed.    Review of Systems  CONSTITUTIONAL: NEGATIVE for fever,  chills, change in weight  INTEGUMENTARY/SKIN: NEGATIVE for worrisome rashes, moles or lesions  EYES: NEGATIVE for vision changes or irritation  ENT/MOUTH: NEGATIVE for ear, mouth and throat problems  RESP: NEGATIVE for significant cough or SOB  CV: NEGATIVE for chest pain, palpitations or peripheral edema  GI: NEGATIVE for nausea, abdominal pain, heartburn, or change in bowel habits  : NEGATIVE for frequency, dysuria, or hematuria  MUSCULOSKELETAL:POSITIVE  for arthralgias bilateral thighs bilaterally  NEURO: NEGATIVE for weakness, dizziness or paresthesias  ENDOCRINE: NEGATIVE for temperature intolerance, skin/hair changes  HEME: NEGATIVE for bleeding problems  PSYCHIATRIC: NEGATIVE for changes in mood or affect    Patient Active Problem List    Diagnosis Date Noted     Morbid obesity (H) 12/10/2020     Priority: Medium     Hypertension, unspecified type 2020     Priority: Medium     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 No pertinent surgical history.  Current Outpatient Medications   Medication Sig Dispense Refill     ketoconazole (NIZORAL) 2 % external cream Apply topically 2 times daily (Patient not taking: Reported on 2022) 30 g 1     minocycline (DYNACIN) 100 MG tablet Take 1 tablet (100 mg) by mouth 2 times daily (Patient not taking: Reported on 3/10/2022) 180 tablet 0     minocycline (MINOCIN) 100 MG capsule  (Patient not taking: Reported on 2022)         No Known Allergies     Social History     Tobacco Use     Smoking status: Former     Packs/day: 1.00     Types: Cigarettes     Quit date:      Years since quittin.8     Smokeless tobacco: Never   Substance Use Topics     Alcohol use: Yes     Comment: Social use.     Family History   Problem Relation Age of Onset      "Diabetes Maternal Grandmother      Crohn's Disease Son      History   Drug Use Unknown         Objective     /80   Pulse 86   Temp 97.8  F (36.6  C) (Tympanic)   Resp 14   Ht 1.803 m (5' 11\")   Wt 123.3 kg (271 lb 12.8 oz)   SpO2 98%   BMI 37.91 kg/m      Physical Exam    GENERAL APPEARANCE: healthy, alert and no distress     EYES: EOMI,  PERRL     HENT: ear canals and TM's normal and nose and mouth without ulcers or lesions     NECK: no adenopathy, no asymmetry, masses, or scars and thyroid normal to palpation     RESP: lungs clear to auscultation - no rales, rhonchi or wheezes     CV: regular rates and rhythm, normal S1 S2, no S3 or S4 and no murmur, click or rub     ABDOMEN:  soft, nontender, no HSM or masses and bowel sounds normal     MS: extremities normal- no gross deformities noted, no evidence of inflammation in joints, FROM in all extremities.     SKIN: no suspicious lesions or rashes     NEURO: Normal strength and tone, sensory exam grossly normal, mentation intact and speech normal     PSYCH: mentation appears normal. and affect normal/bright     LYMPHATICS: No cervical adenopathy    Recent Labs   Lab Test 05/09/22  0728   A1C 5.4      Diagnostics:  No labs were ordered during this visit.   No EKG required, no history of coronary heart disease, significant arrhythmia, peripheral arterial disease or other structural heart disease.    Revised Cardiac Risk Index (RCRI):  The patient has the following serious cardiovascular risks for perioperative complications:   - No serious cardiac risks = 0 points     RCRI Interpretation: 0 points: Class I (very low risk - 0.4% complication rate)    LUTHER Garcia-Student    Signed Electronically by: Sherice Alfredo NP  Copy of this evaluation report is provided to requesting physician.    I, Sherice Alfredo, was present with the NP student who participated in the service and in the documentationof the note.   I have verified the history and " personally performed the physical exam and medical decision making.  I agree with the assessment and plan of care as documented in the note.     Sherice Alfredo NP on 11/7/2022 at 5:54 PM

## 2022-11-09 LAB — TESTOST SERPL-MCNC: 450 NG/DL (ref 240–950)

## 2022-11-10 DIAGNOSIS — N52.9 ERECTILE DYSFUNCTION, UNSPECIFIED ERECTILE DYSFUNCTION TYPE: Primary | ICD-10-CM

## 2022-11-10 RX ORDER — TADALAFIL 10 MG/1
10-20 TABLET ORAL DAILY PRN
Qty: 24 TABLET | Refills: 3 | Status: SHIPPED | OUTPATIENT
Start: 2022-11-10 | End: 2022-12-15 | Stop reason: ALTCHOICE

## 2022-12-15 ENCOUNTER — TELEPHONE (OUTPATIENT)
Dept: FAMILY MEDICINE | Facility: CLINIC | Age: 62
End: 2022-12-15

## 2022-12-15 DIAGNOSIS — N52.9 ERECTILE DYSFUNCTION, UNSPECIFIED ERECTILE DYSFUNCTION TYPE: Primary | ICD-10-CM

## 2022-12-15 RX ORDER — SILDENAFIL CITRATE 20 MG/1
20 TABLET ORAL 3 TIMES DAILY
Status: CANCELLED | OUTPATIENT
Start: 2022-12-15

## 2022-12-15 RX ORDER — SILDENAFIL 50 MG/1
50-100 TABLET, FILM COATED ORAL DAILY PRN
Qty: 180 TABLET | Refills: 3 | Status: SHIPPED | OUTPATIENT
Start: 2022-12-15 | End: 2023-11-14

## 2022-12-15 NOTE — TELEPHONE ENCOUNTER
Routed to provider.    Pt called to report that Cialis is not covered under pt's insurance.  However, sildenafil, 20 mg is covered.      Needs to be written for 90 day supply and needs to be filled at a mail/specialty pharmacy.      Also, cannot be written for more than 90 tabs per 30 day period.     Pt chooses [facility] specialty pharmacy.    Dunia Dean RN

## 2022-12-21 ENCOUNTER — TELEPHONE (OUTPATIENT)
Dept: FAMILY MEDICINE | Facility: CLINIC | Age: 62
End: 2022-12-21

## 2022-12-21 NOTE — TELEPHONE ENCOUNTER
[facility]    Fax:[phone]    Spec: [phone]    [place]: [phone]

## 2022-12-22 NOTE — TELEPHONE ENCOUNTER
PA Initiation    Medication: Sildenafil Citrate 50mg tabs  Insurance Company: [employer] [place] - Phone [phone] Fax [phone]  Pharmacy Filling the Rx: [facility] - [place], [place] - [facility] [address]  Filling Pharmacy Phone: [phone]  Filling Pharmacy Fax:    Start Date: 12/22/2022    Filled out form and faxed to insurance.

## 2022-12-23 ENCOUNTER — TELEPHONE (OUTPATIENT)
Dept: FAMILY MEDICINE | Facility: CLINIC | Age: 62
End: 2022-12-23

## 2022-12-23 NOTE — LETTER
[facility]  [address]  [facility] [address]  Phone: [phone]    December 23, 2022    To  Esdras Snider  [address]  [facility] [address]    Your team at [facility] cares about your health. We have reviewed your chart and based on our findings; we are making the following recommendations to better manage your health.     You are in particular need of attention regarding the following:     Call or MyChart message your clinic to schedule a colonoscopy, schedule/ a FIT Test, or order a Cologuard test. If you are unsure what type of test you need, please call your clinic and speak to clinic staff.   Colon cancer is now the second leading cause of cancer-related deaths in the [place] for both men and women and there are over 130,000 new cases and 50,000 deaths per year from colon cancer. Colonoscopies can prevent 90-95% of these deaths. Problem lesions can be removed before they ever become cancer. This test is not only looking for cancer, but also getting rid of precancerous lesions.   If you are under/uninsured, we recommend you contact the [employer] Program.[employer] is a free colorectal cancer screening program that provides colonoscopies for eligible under/uninsured [place] men and women. If you are interested in receiving a free colonoscopy, please call [employer] at t [phone] (mention code ScopesWeb) to see if you're eligible. Please have them send us the results.   Please call [phone] to schedule a colonoscopy.    If you have already completed these items, please contact the clinic via phone or   [employer] so your care team can review and update your records. Thank you for   choosing [facility] for your healthcare needs. For any questions,   concerns, or to schedule an appointment please contact our clinic.    Healthy Regards,      Your [facility] Care Team

## 2022-12-23 NOTE — TELEPHONE ENCOUNTER
Patient Quality Outreach    Patient is due for the following:   Colon Cancer Screening    Next Steps:   pt to schedule    Type of outreach:    Sent letter.      Questions for provider review:    None     Felice Espino

## 2022-12-26 NOTE — TELEPHONE ENCOUNTER
PRIOR AUTHORIZATION DENIED    Medication: Sildenafil Citrate 50mg tabs    Denial Date: 12/26/2022    Denial Rational: Medication is excluded

## 2022-12-28 NOTE — TELEPHONE ENCOUNTER
Left message for patient that his insurance does not cover his mediation and it will need to be paid for out of pocket.  I asked that he call or MyChart message me with any questions.  Sherice Alfredo NP on 12/28/2022 at 7:56 AM

## 2023-01-03 ENCOUNTER — TELEPHONE (OUTPATIENT)
Dept: PEDIATRICS | Facility: CLINIC | Age: 63
End: 2023-01-03

## 2023-01-03 NOTE — TELEPHONE ENCOUNTER
Screening Questions  BLUE  KIND OF PREP RED  LOCATION [review exclusion criteria] GREEN  SEDATION TYPE        Y Are you active on mychart?       [place] Ordering/Referring Provider?        [employer] What type of coverage do you have?      N Have you had a positive covid test in the last 14 days?      37.91 1. BMI  [BMI 40+ - review exclusion criteria]    Y  2. Are you able to give consent for your medical care? [IF NO,RN REVIEW]        N  3. Are you taking any prescription pain medications on a routine schedule?      N  3a. EXTENDED PREP What kind of prescription?     N 4. Do you have any chemical dependencies such as alcohol, street drugs, or methadone?    N 5. Do you have any history of post-traumatic stress syndrome, severe anxiety or history of psychosis?      **If yes 3- 5 , please schedule with MAC sedation.**          IF YES TO ANY 6 - 10 - HOSPITAL SETTING ONLY.     N 6.   Do you need assistance transferring?     N 7.   Have you had a heart or lung transplant?    N 8.   Are you currently on dialysis?   N 9.   Do you use daily home oxygen?   N 10. Do you take nitroglycerin?   10a. N If yes, how often?     11. [FEMALES]  N Are you currently pregnant?    11a. N If yes, how many weeks? [ Greater than 12 weeks, OR NEEDED]    N 12. Do you have Pulmonary Hypertension? *NEED PAC APPT AT [facility]*     N 13. [review exclusion criteria]  Do you have any implantable devices in your body (pacemaker, defib, LVAD)?    N 14. In the past 6 months, have you had any heart related issues including cardiomyopathy or heart attack?     14a. N If yes, did it require cardiac stenting if so when?     N 15. Have you had a stroke or Transient ischemic attack (TIA - aka  mini stroke ) within 6 months?      N 16. Do you have mod to severe Obstructive Sleep Apnea?  [Hospital only]    N 17. Do you have SEVERE AND UNCONTROLLED asthma? *NEED PAC APPT AT [facility]*     N 18. Are you currently taking any blood thinners?     18a. If yes, inform patient  "to \"follow up w/ ordering provider for bridging instructions.\"    N 19. Do you take the medication Phentermine?    19a. If yes, \"Hold for 7 days before procedure.  Please consult your prescribing provider if you have questions about holding this medication.\"     N  20. Do you have chronic kidney disease?      N  21. Do you have a diagnosis of diabetes?     N  22. On a regular basis do you go 3-5 days between bowel movements?     See below 23. Preferred LOCAL Pharmacy for Pre Prescription    [ LIST ONLY ONE PHARMACY]        [id number] - [place], [place] - [address]    - CLOSING REMINDERS -    Informed patient they will need an adult    Cannot take any type of public or medical transportation alone    Conscious Sedation- Needs  for 6 hours after the procedure       MAC/General-Needs  for 24 hours after procedure    Pre-Procedure Covid test to be completed [[facility] PCR Testing Required]    Confirmed Nurse will call to complete assessment       - SCHEDULING DETAILS -  N Hospital Setting Required? If yes, what is the exclusion?: STARR Costa  Surgeon    2-22-23  Date of Procedure  Lower Endoscopy [Colonoscopy]  Type of Procedure Scheduled  [facility]-If you answer yes to questions #8, #20, #21Which Colonoscopy Prep was Sent?     GEN Sedation Type     N PAC / Pre-op Required                 "

## 2023-03-20 ENCOUNTER — TELEPHONE (OUTPATIENT)
Dept: FAMILY MEDICINE | Facility: CLINIC | Age: 63
End: 2023-03-20
Payer: COMMERCIAL

## 2023-03-20 NOTE — LETTER
[facility]  [address]  [facility] [address]  Phone: [phone]    March 20, 2023    To  Esdras Snider  [address]  [facility] [address]    Your team at [facility] cares about your health. We have reviewed your chart and based on our findings; we are making the following recommendations to better manage your health.     You are in particular need of attention regarding the following:     Call or MyChart message your clinic to schedule a colonoscopy, schedule/ a FIT Test, or order a Cologuard test. If you are unsure what type of test you need, please call your clinic and speak to clinic staff.   Colon cancer is now the second leading cause of cancer-related deaths in the [place] for both men and women and there are over 130,000 new cases and 50,000 deaths per year from colon cancer. Colonoscopies can prevent 90-95% of these deaths. Problem lesions can be removed before they ever become cancer. This test is not only looking for cancer, but also getting rid of precancerous lesions.   If you are under/uninsured, we recommend you contact the [employer] Program.[employer] is a free colorectal cancer screening program that provides colonoscopies for eligible under/uninsured [place] men and women. If you are interested in receiving a free colonoscopy, please call [employer] at t [phone] (mention code [employer]Web) to see if you're eligible. Please have them send us the results.   Schedule an office visit with your provider if you are interested in completing your colon cancer screening with a Cologuard test    If you have already completed these items, please contact the clinic via phone or   [employer] so your care team can review and update your records. Thank you for   choosing [facility] for your healthcare needs. For any questions,   concerns, or to schedule an appointment please contact our clinic.    Healthy  Regards,      Your [facility] Team

## 2023-05-05 RX ORDER — BISACODYL 5 MG/1
TABLET, DELAYED RELEASE ORAL
Qty: 4 TABLET | Refills: 0 | Status: SHIPPED | OUTPATIENT
Start: 2023-05-05 | End: 2023-11-14

## 2023-05-06 ENCOUNTER — HEALTH MAINTENANCE LETTER (OUTPATIENT)
Age: 63
End: 2023-05-06

## 2023-05-09 ENCOUNTER — APPOINTMENT (OUTPATIENT)
Dept: MRI IMAGING | Facility: CLINIC | Age: 63
End: 2023-05-09
Attending: FAMILY MEDICINE
Payer: COMMERCIAL

## 2023-05-09 ENCOUNTER — APPOINTMENT (OUTPATIENT)
Dept: CT IMAGING | Facility: CLINIC | Age: 63
End: 2023-05-09
Attending: FAMILY MEDICINE
Payer: COMMERCIAL

## 2023-05-09 ENCOUNTER — NURSE TRIAGE (OUTPATIENT)
Dept: FAMILY MEDICINE | Facility: CLINIC | Age: 63
End: 2023-05-09
Payer: COMMERCIAL

## 2023-05-09 ENCOUNTER — ANESTHESIA EVENT (OUTPATIENT)
Dept: GASTROENTEROLOGY | Facility: CLINIC | Age: 63
End: 2023-05-09
Payer: COMMERCIAL

## 2023-05-09 ENCOUNTER — HOSPITAL ENCOUNTER (EMERGENCY)
Facility: CLINIC | Age: 63
Discharge: HOME | End: 2023-05-09
Attending: FAMILY MEDICINE | Admitting: FAMILY MEDICINE
Payer: COMMERCIAL

## 2023-05-09 VITALS
DIASTOLIC BLOOD PRESSURE: 149 MMHG | RESPIRATION RATE: 16 BRPM | HEART RATE: 84 BPM | SYSTOLIC BLOOD PRESSURE: 164 MMHG | OXYGEN SATURATION: 97 % | TEMPERATURE: 98.2 F

## 2023-05-09 DIAGNOSIS — R20.0 LEFT FACIAL NUMBNESS: ICD-10-CM

## 2023-05-09 DIAGNOSIS — G43.809 MIGRAINE VARIANT WITH HEADACHE: ICD-10-CM

## 2023-05-09 DIAGNOSIS — R51.9 ACUTE NONINTRACTABLE HEADACHE, UNSPECIFIED HEADACHE TYPE: ICD-10-CM

## 2023-05-09 LAB
ANION GAP SERPL CALCULATED.3IONS-SCNC: 11 MMOL/L (ref 7–15)
APTT PPP: 24 SECONDS (ref 22–38)
BASOPHILS # BLD AUTO: 0.1 10E3/UL (ref 0–0.2)
BASOPHILS NFR BLD AUTO: 1 %
BUN SERPL-MCNC: 15.9 MG/DL (ref 8–23)
CALCIUM SERPL-MCNC: 9.4 MG/DL (ref 8.8–10.2)
CHLORIDE SERPL-SCNC: 106 MMOL/L (ref 98–107)
CREAT SERPL-MCNC: 0.93 MG/DL (ref 0.67–1.17)
DEPRECATED HCO3 PLAS-SCNC: 23 MMOL/L (ref 22–29)
EOSINOPHIL # BLD AUTO: 0.2 10E3/UL (ref 0–0.7)
EOSINOPHIL NFR BLD AUTO: 2 %
ERYTHROCYTE [DISTWIDTH] IN BLOOD BY AUTOMATED COUNT: 12.8 % (ref 10–15)
GFR SERPL CREATININE-BSD FRML MDRD: >90 ML/MIN/1.73M2
GLUCOSE SERPL-MCNC: 93 MG/DL (ref 70–99)
HCT VFR BLD AUTO: 42.6 % (ref 40–53)
HGB BLD-MCNC: 14.2 G/DL (ref 13.3–17.7)
HOLD SPECIMEN: NORMAL
IMM GRANULOCYTES # BLD: 0 10E3/UL
IMM GRANULOCYTES NFR BLD: 0 %
INR PPP: 0.93 (ref 0.85–1.15)
LYMPHOCYTES # BLD AUTO: 2 10E3/UL (ref 0.8–5.3)
LYMPHOCYTES NFR BLD AUTO: 29 %
MCH RBC QN AUTO: 29.9 PG (ref 26.5–33)
MCHC RBC AUTO-ENTMCNC: 33.3 G/DL (ref 31.5–36.5)
MCV RBC AUTO: 90 FL (ref 78–100)
MONOCYTES # BLD AUTO: 0.7 10E3/UL (ref 0–1.3)
MONOCYTES NFR BLD AUTO: 9 %
NEUTROPHILS # BLD AUTO: 4 10E3/UL (ref 1.6–8.3)
NEUTROPHILS NFR BLD AUTO: 59 %
NRBC # BLD AUTO: 0 10E3/UL
NRBC BLD AUTO-RTO: 0 /100
PLATELET # BLD AUTO: 271 10E3/UL (ref 150–450)
POTASSIUM SERPL-SCNC: 4.1 MMOL/L (ref 3.4–5.3)
RBC # BLD AUTO: 4.75 10E6/UL (ref 4.4–5.9)
SODIUM SERPL-SCNC: 140 MMOL/L (ref 136–145)
TROPONIN T SERPL HS-MCNC: 6 NG/L
WBC # BLD AUTO: 7 10E3/UL (ref 4–11)

## 2023-05-09 PROCEDURE — 255N000002 HC RX 255 OP 636: Performed by: FAMILY MEDICINE

## 2023-05-09 PROCEDURE — 93005 ELECTROCARDIOGRAM TRACING: CPT | Performed by: FAMILY MEDICINE

## 2023-05-09 PROCEDURE — A9585 GADOBUTROL INJECTION: HCPCS | Performed by: FAMILY MEDICINE

## 2023-05-09 PROCEDURE — 99284 EMERGENCY DEPT VISIT MOD MDM: CPT | Mod: 25 | Performed by: FAMILY MEDICINE

## 2023-05-09 PROCEDURE — 85610 PROTHROMBIN TIME: CPT | Performed by: FAMILY MEDICINE

## 2023-05-09 PROCEDURE — 85004 AUTOMATED DIFF WBC COUNT: CPT | Performed by: FAMILY MEDICINE

## 2023-05-09 PROCEDURE — 250N000009 HC RX 250: Performed by: FAMILY MEDICINE

## 2023-05-09 PROCEDURE — 93010 ELECTROCARDIOGRAM REPORT: CPT | Performed by: FAMILY MEDICINE

## 2023-05-09 PROCEDURE — 84484 ASSAY OF TROPONIN QUANT: CPT | Performed by: FAMILY MEDICINE

## 2023-05-09 PROCEDURE — 85730 THROMBOPLASTIN TIME PARTIAL: CPT | Performed by: FAMILY MEDICINE

## 2023-05-09 PROCEDURE — 250N000011 HC RX IP 250 OP 636: Performed by: FAMILY MEDICINE

## 2023-05-09 PROCEDURE — 70553 MRI BRAIN STEM W/O & W/DYE: CPT

## 2023-05-09 PROCEDURE — 36415 COLL VENOUS BLD VENIPUNCTURE: CPT | Performed by: FAMILY MEDICINE

## 2023-05-09 PROCEDURE — 99285 EMERGENCY DEPT VISIT HI MDM: CPT | Mod: 25 | Performed by: FAMILY MEDICINE

## 2023-05-09 PROCEDURE — 82310 ASSAY OF CALCIUM: CPT | Performed by: FAMILY MEDICINE

## 2023-05-09 PROCEDURE — 70498 CT ANGIOGRAPHY NECK: CPT

## 2023-05-09 RX ORDER — IOPAMIDOL 755 MG/ML
68 INJECTION, SOLUTION INTRAVASCULAR ONCE
Status: COMPLETED | OUTPATIENT
Start: 2023-05-09 | End: 2023-05-09

## 2023-05-09 RX ORDER — GADOBUTROL 604.72 MG/ML
12 INJECTION INTRAVENOUS ONCE
Status: COMPLETED | OUTPATIENT
Start: 2023-05-09 | End: 2023-05-09

## 2023-05-09 RX ORDER — OXYCODONE HYDROCHLORIDE 5 MG/1
5 TABLET ORAL EVERY 6 HOURS PRN
Qty: 12 TABLET | Refills: 0 | Status: SHIPPED | OUTPATIENT
Start: 2023-05-09 | End: 2023-05-12

## 2023-05-09 RX ORDER — SODIUM CHLORIDE 9 MG/ML
INJECTION, SOLUTION INTRAVENOUS CONTINUOUS PRN
Status: DISCONTINUED | OUTPATIENT
Start: 2023-05-09 | End: 2023-05-09 | Stop reason: HOSPADM

## 2023-05-09 RX ADMIN — IOPAMIDOL 68 ML: 755 INJECTION, SOLUTION INTRAVENOUS at 17:30

## 2023-05-09 RX ADMIN — SODIUM CHLORIDE 100 ML: 9 INJECTION, SOLUTION INTRAVENOUS at 17:30

## 2023-05-09 RX ADMIN — GADOBUTROL 12 ML: 604.72 INJECTION INTRAVENOUS at 17:50

## 2023-05-09 ASSESSMENT — ACTIVITIES OF DAILY LIVING (ADL)
ADLS_ACUITY_SCORE: 35
ADLS_ACUITY_SCORE: 35

## 2023-05-09 ASSESSMENT — LIFESTYLE VARIABLES: TOBACCO_USE: 1

## 2023-05-09 NOTE — H&P (VIEW-ONLY)
"  History     Chief Complaint   Patient presents with     Numbness     Numbness left side of face for approx the last 3 wks   Left-sided numbness, headache  HPI  Esdras Snider is a 63 year old male, past medical history is significant for morbid obesity, hypertension, ulcerative rectosigmoiditis, ulcerative colitis, acne, presents to the emergency department with concerns of left-sided numbness, headache.  History is obtained from the patient who was seen promptly as a stroke evaluation.  The patient states that he has had frontal headache, ill-defined but some throbbing component, achy, occasional sharp shooting component over the preceding 3 weeks not associated with nausea or vomiting or visual change.  The patient originally thought this might be related to his caffeine intake and so he has decreased his caffeine intake but that has not helped.  He is also tried Advil but this really has not helped.  Headache is currently absent.  Over the last 2-1/2 days he has had left-sided facial numbness which has become progressively worse over that time period.  He also feels like his left eye is heavy although he has not noticed any visual change or any field cut.  He contacted his primary care provider's office, at his girlfriend's insistence, who sent him to the emergency department.   He notes no paresthesias or weakness of arms or legs.  He has not noticed any weakness of his face, no difficulty smiling or chewing food, no drooling, no difficulties with closing the eyes.  He has never had headaches like this before, he describes himself as a person who \"does not get headaches\".  There is no family history for migraine headaches, seizure disorder.    Allergies:  No Known Allergies    Problem List:    Patient Active Problem List    Diagnosis Date Noted     Morbid obesity (H) 12/10/2020     Priority: Medium     Hypertension, unspecified type 01/27/2020     Priority: Medium     Ulcerative rectosigmoiditis without " complication (H) 2015     Priority: Medium     UC (ulcerative colitis) (H) 2013     Priority: Medium        Past Medical History:    Past Medical History:   Diagnosis Date     Acne      Ulcerative colitis (H)        Past Surgical History:    No past surgical history on file.    Family History:    Family History   Problem Relation Age of Onset     Diabetes Maternal Grandmother      Crohn's Disease Son        Social History:  Marital Status:  Single [1]  Social History     Tobacco Use     Smoking status: Former     Packs/day: 1.00     Types: Cigarettes     Quit date:      Years since quittin.3     Smokeless tobacco: Never   Vaping Use     Vaping status: Never Used     Passive vaping exposure: Yes   Substance Use Topics     Alcohol use: Yes     Comment: Social use.     Drug use: Never        Medications:    oxyCODONE (ROXICODONE) 5 MG tablet  bisacodyl (DULCOLAX) 5 MG EC tablet  minocycline (MINOCIN) 100 MG capsule  polyethylene glycol (GOLYTELY) 236 g suspension  sildenafil (VIAGRA) 50 MG tablet          Review of Systems   All other systems reviewed and are negative.      Physical Exam   BP: (!) 179/111  Pulse: 68  Temp: 98.2  F (36.8  C)  Resp: 14  SpO2: 98 %      Physical Exam  Vitals and nursing note reviewed.   Constitutional:       General: He is not in acute distress.     Appearance: Normal appearance. He is normal weight. He is not ill-appearing.      Comments: Alert, no acute distress, no facial asymmetry.   HENT:      Head: Normocephalic and atraumatic.      Right Ear: Tympanic membrane, ear canal and external ear normal.      Left Ear: Tympanic membrane, ear canal and external ear normal.      Nose: Nose normal.      Mouth/Throat:      Mouth: Mucous membranes are dry.      Pharynx: Oropharynx is clear.   Eyes:      Extraocular Movements: Extraocular movements intact.      Conjunctiva/sclera: Conjunctivae normal.      Pupils: Pupils are equal, round, and reactive to light.    Cardiovascular:      Rate and Rhythm: Normal rate and regular rhythm.      Pulses: Normal pulses.      Heart sounds: Normal heart sounds.   Pulmonary:      Effort: Pulmonary effort is normal.      Breath sounds: Normal breath sounds.   Abdominal:      General: Bowel sounds are normal.      Palpations: Abdomen is soft.   Musculoskeletal:         General: Normal range of motion.      Cervical back: Normal range of motion and neck supple.   Skin:     General: Skin is warm and dry.      Capillary Refill: Capillary refill takes less than 2 seconds.   Neurological:      General: No focal deficit present.      Mental Status: He is alert and oriented to person, place, and time.      GCS: GCS eye subscore is 4. GCS verbal subscore is 5. GCS motor subscore is 6.      Cranial Nerves: Cranial nerves 2-12 are intact.      Sensory: Sensation is intact.      Motor: Motor function is intact.      Coordination: Coordination is intact.      Gait: Gait is intact.      Deep Tendon Reflexes: Reflexes are normal and symmetric. Babinski sign absent on the right side. Babinski sign absent on the left side.      Reflex Scores:       Tricep reflexes are 2+ on the right side and 2+ on the left side.       Bicep reflexes are 2+ on the right side and 2+ on the left side.       Brachioradialis reflexes are 2+ on the right side and 2+ on the left side.       Patellar reflexes are 2+ on the right side and 2+ on the left side.       Achilles reflexes are 2+ on the right side and 2+ on the left side.        ED Course                 Procedures              EKG Interpretation:      Interpreted by Alfonso Mercado MD  Time reviewed: Time obtained 1720 time interpreted same sinus rhythm 90 bpm poor R wave progression, no acute ST-T wave changes normal axis, normal intervals.        Critical Care time:  was 37 minutes for this patient excluding procedures.           Results for orders placed or performed during the hospital encounter of 05/09/23  (from the past 24 hour(s))   [facility]    Narrative    The following orders were created for panel order [facility].  Procedure                               Abnormality         Status                     ---------                               -----------         ------                     Extra Blue Top Tube[[id number]]                              Final result               Extra Red Top Tube[[id number]]                               Final result               Extra Green Top (Lithium...[[id number]]                      Final result               Extra Purple Top Tube[[id number]]                            Final result                 Please view results for these tests on the individual orders.   Extra Blue Top Tube   Result Value Ref Range    Hold Specimen jic    Extra Red Top Tube   Result Value Ref Range    Hold Specimen jic    Extra Green Top (Lithium Heparin) Tube   Result Value Ref Range    Hold Specimen jic    Extra Purple Top Tube   Result Value Ref Range    Hold Specimen jic    CBC with Platelets & Differential    Narrative    The following orders were created for panel order CBC with Platelets & Differential.  Procedure                               Abnormality         Status                     ---------                               -----------         ------                     CBC with platelets and d...[[id number]]                      Final result                 Please view results for these tests on the individual orders.   Basic metabolic panel   Result Value Ref Range    Sodium 140 136 - 145 mmol/L    Potassium 4.1 3.4 - 5.3 mmol/L    Chloride 106 98 - 107 mmol/L    Carbon Dioxide (CO2) 23 22 - 29 mmol/L    Anion Gap 11 7 - 15 mmol/L    Urea Nitrogen 15.9 8.0 - 23.0 mg/dL    Creatinine 0.93 0.67 - 1.17 mg/dL    Calcium 9.4 8.8 - 10.2 mg/dL    Glucose 93 70 - 99 mg/dL    GFR Estimate >90 >60 mL/min/1.73m2   INR   Result Value Ref Range    INR 0.93 0.85 - 1.15   Partial thromboplastin time    Result Value Ref Range    aPTT 24 22 - 38 Seconds   Troponin T, High Sensitivity   Result Value Ref Range    Troponin T, High Sensitivity 6 <=22 ng/L   CBC with platelets and differential   Result Value Ref Range    WBC Count 7.0 4.0 - 11.0 10e3/uL    RBC Count 4.75 4.40 - 5.90 10e6/uL    Hemoglobin 14.2 13.3 - 17.7 g/dL    Hematocrit 42.6 40.0 - 53.0 %    MCV 90 78 - 100 fL    MCH 29.9 26.5 - 33.0 pg    MCHC 33.3 31.5 - 36.5 g/dL    RDW 12.8 10.0 - 15.0 %    Platelet Count 271 150 - 450 10e3/uL    % Neutrophils 59 %    % Lymphocytes 29 %    % Monocytes 9 %    % Eosinophils 2 %    % Basophils 1 %    % Immature Granulocytes 0 %    NRBCs per 100 WBC 0 <1 /100    Absolute Neutrophils 4.0 1.6 - 8.3 10e3/uL    Absolute Lymphocytes 2.0 0.8 - 5.3 10e3/uL    Absolute Monocytes 0.7 0.0 - 1.3 10e3/uL    Absolute Eosinophils 0.2 0.0 - 0.7 10e3/uL    Absolute Basophils 0.1 0.0 - 0.2 10e3/uL    Absolute Immature Granulocytes 0.0 <=0.4 10e3/uL    Absolute NRBCs 0.0 10e3/uL   CTA Head Neck with Contrast    Narrative    EXAM: CTA HEAD NECK W CONTRAST  LOCATION: [facility]  DATE/TIME: 5/9/2023 5:59 PM CDT    INDICATION: Acute neuro deficit, stroke suspected  COMPARISON: None.  CONTRAST: 68mL Isovue 370  TECHNIQUE: Head and neck CT angiogram with IV contrast. Axial helical CT images of the head and neck vessels obtained during the arterial phase of intravenous contrast administration. Axial 2D reconstructed images and multiplanar 3D MIP reconstructed   images of the head and neck vessels were performed by the technologist. Dose reduction techniques were used. All stenosis measurements made according to NASCET criteria unless otherwise specified.    FINDINGS:    HEAD CTA:  ANTERIOR CIRCULATION: No proximal branch vessel occlusion or flow-limiting stenosis. No aneurysm or high-flow vascular malformation. Mild atherosclerosis at the cavernous left and clinoid bilateral ICA segments.     POSTERIOR  CIRCULATION: No proximal branch vessel occlusion or flow-limiting stenosis. No aneurysm or high-flow vascular malformation. Patent posterior communicating artery on the right.    DURAL VENOUS SINUSES: Expected enhancement of the major dural venous sinuses.    NECK CTA:  RIGHT CAROTID: Mild atherosclerosis without hemodynamically significant stenosis by NASCET criteria at the carotid bifurcation. No dissection.    LEFT CAROTID: Mild atherosclerosis without hemodynamically significant stenosis by NASCET criteria at the carotid bifurcation. No dissection.    VERTEBRAL ARTERIES: Codominant vertebral arteries. No flow-limiting stenosis or dissection.    AORTIC ARCH: Three-vessel aortic arch configuration. Mild atherosclerosis at left subclavian artery origin. No great vessel flow-limiting stenosis.    NONVASCULAR STRUCTURES: Lung apices are clear. No neck mass or lymphadenopathy. Thyroid gland is homogenous. No acute or aggressive appearing osseous abnormalities. Degenerative changes most notably at C5-C6 with severe disc space narrowing.      Impression    IMPRESSION:   HEAD CTA:  1.  No proximal branch vessel occlusion, flow-limiting stenosis, aneurysm, or vascular malformation.    NECK CTA:  1.  Mild atherosclerosis at the bilateral carotid bifurcations and left subclavian artery origin. No extracranial carotid or vertebral artery flow-limiting stenosis or findings of dissection.       MR Brain w/o & w Contrast    Narrative    EXAM: MR BRAIN W/O and W CONTRAST  LOCATION: [facility]  DATE/TIME: 5/9/2023 6:11 PM CDT    INDICATION: Acute neuro deficit, stroke suspected  COMPARISON: None.  CONTRAST: 12ml gadavist  TECHNIQUE: Routine multiplanar multisequence head MRI without and with intravenous contrast.    FINDINGS:    INTRACRANIAL CONTENTS: No diffusion restriction to suggest acute/subacute ischemia. No mass effect, acute hemorrhage, or extra-axial collection. Normal parenchymal signal for  age. Normal ventricles and sulci. Normal position of the cerebellar tonsils. No   pathologic contrast enhancement.    SELLA: No abnormality accounting for technique.    OSSEOUS STRUCTURES/SOFT TISSUES: Normal marrow signal. The major intracranial vascular flow voids are maintained.    ORBITS: No visible intraorbital abnormality.     SINUSES/MASTOIDS: No paranasal sinus mucosal disease. No middle ear or mastoid effusion.      Impression    IMPRESSION:  1.  No acute ischemia, mass/mass effect, or abnormal intracranial enhancement.           Medications   sodium chloride 0.9% infusion (has no administration in time range)   iopamidol (ISOVUE-370) solution 68 mL (68 mLs Intravenous $Given 5/9/23 1730)   sodium chloride 0.9 % bag 500 mL for CT scan flush use (100 mLs Intravenous $Given 5/9/23 1730)   gadobutrol (GADAVIST) injection 12 mL (12 mLs Intravenous $Given 5/9/23 4080)   7:43 PM  Remains headache free, still some numbness to the left side of his face as described in the HPI.  Lab diagnostics are all within normal limits, EKG showed no acute findings and is a normal EKG by my read, imaging including MRI with and without contrast of the head is read as normal per radiology.  CTA head neck is similarly reassuring with only minimal atherosclerosis as noted at the carotid bifurcations only.  No concerning findings.  Given the behavior of symptoms with the headache intermittently and the associated facial numbness I suspect that this patient most likely has a migraine variant and discussed this with him.  There are other considerations in the differential including TLE although my chief suspicion would be for migraine variant.  With this in mind I have encouraged him to use Tylenol/ibuprofen as pain medication at baseline, oxycodone was prescribed for breakthrough pain.  I would like him to follow-up with neurology for further discussion and potentially further evaluation.  If he experiences any significant worsening  or changes he is advised to return to the emergency department.           Assessments & Plan (with Medical Decision Making)   Assessments and plan with medical decision making at the time stamp above.      Disclaimer: This note consists of symbols derived from keyboarding, dictation and/or voice recognition software. As a result, there may be errors in the script that have gone undetected. Please consider this when interpreting information found in this chart.      I have reviewed the nursing notes.    I have reviewed the findings, diagnosis, plan and need for follow up with the patient.    New Prescriptions    OXYCODONE (ROXICODONE) 5 MG TABLET    Take 1 tablet (5 mg) by mouth every 6 hours as needed for pain       Final diagnoses:   Acute nonintractable headache, unspecified headache type   Left facial numbness   Migraine variant with headache - Suspected       5/9/2023   [facility] EMERGENCY DEPT     Alfonso Mercado MD  05/09/23 3110       Alfonso Mercado MD  05/12/23 [id number]

## 2023-05-09 NOTE — TELEPHONE ENCOUNTER
Routed to provider for further instructions.  Pt is asking to be seen in clinic.    S-(situation): Pt reports headache x 3 weeks off and on over left side of face in sinus pocket over forehead.  Report forehead pressure and heavy feeling.  Headache is moderate aching pain.  Also, post nasal drainage down back of throat.  Today, day 3 of left side of face feels numb.  Numbness does not radiate anywhere.    Denies fever or chills.  Denies rashes.  Denies vision changes.  Post nasal drainage but no cough.  Denies lightheaded or dizzy.  Denies any radiating symptoms.  Denies wheezing or breathing changes.    B-(background): no prior history reported.  No pertinent symptoms on problem list.    A-(assessment): Protocol advised 911 due to facial numbness.    R-(recommendations): Advised ED but pt refuses.  Pt states that Sherice Alfredo knows him and he wants Sherice to advise.    Dunia Dean RN

## 2023-05-09 NOTE — ED PROVIDER NOTES
"  History     Chief Complaint   Patient presents with     Numbness     Numbness left side of face for approx the last 3 wks   Left-sided numbness, headache  HPI  Esdras Snider is a 63 year old male, past medical history is significant for morbid obesity, hypertension, ulcerative rectosigmoiditis, ulcerative colitis, acne, presents to the emergency department with concerns of left-sided numbness, headache.  History is obtained from the patient who was seen promptly as a stroke evaluation.  The patient states that he has had frontal headache, ill-defined but some throbbing component, achy, occasional sharp shooting component over the preceding 3 weeks not associated with nausea or vomiting or visual change.  The patient originally thought this might be related to his caffeine intake and so he has decreased his caffeine intake but that has not helped.  He is also tried Advil but this really has not helped.  Headache is currently absent.  Over the last 2-1/2 days he has had left-sided facial numbness which has become progressively worse over that time period.  He also feels like his left eye is heavy although he has not noticed any visual change or any field cut.  He contacted his primary care provider's office, at his girlfriend's insistence, who sent him to the emergency department.   He notes no paresthesias or weakness of arms or legs.  He has not noticed any weakness of his face, no difficulty smiling or chewing food, no drooling, no difficulties with closing the eyes.  He has never had headaches like this before, he describes himself as a person who \"does not get headaches\".  There is no family history for migraine headaches, seizure disorder.    Allergies:  No Known Allergies    Problem List:    Patient Active Problem List    Diagnosis Date Noted     Morbid obesity (H) 12/10/2020     Priority: Medium     Hypertension, unspecified type 01/27/2020     Priority: Medium     Ulcerative rectosigmoiditis without " complication (H) 2015     Priority: Medium     UC (ulcerative colitis) (H) 2013     Priority: Medium        Past Medical History:    Past Medical History:   Diagnosis Date     Acne      Ulcerative colitis (H)        Past Surgical History:    No past surgical history on file.    Family History:    Family History   Problem Relation Age of Onset     Diabetes Maternal Grandmother      Crohn's Disease Son        Social History:  Marital Status:  Single [1]  Social History     Tobacco Use     Smoking status: Former     Packs/day: 1.00     Types: Cigarettes     Quit date:      Years since quittin.3     Smokeless tobacco: Never   Vaping Use     Vaping status: Never Used     Passive vaping exposure: Yes   Substance Use Topics     Alcohol use: Yes     Comment: Social use.     Drug use: Never        Medications:    oxyCODONE (ROXICODONE) 5 MG tablet  bisacodyl (DULCOLAX) 5 MG EC tablet  minocycline (MINOCIN) 100 MG capsule  polyethylene glycol (GOLYTELY) 236 g suspension  sildenafil (VIAGRA) 50 MG tablet          Review of Systems   All other systems reviewed and are negative.      Physical Exam   BP: (!) 179/111  Pulse: 68  Temp: 98.2  F (36.8  C)  Resp: 14  SpO2: 98 %      Physical Exam  Vitals and nursing note reviewed.   Constitutional:       General: He is not in acute distress.     Appearance: Normal appearance. He is normal weight. He is not ill-appearing.      Comments: Alert, no acute distress, no facial asymmetry.   HENT:      Head: Normocephalic and atraumatic.      Right Ear: Tympanic membrane, ear canal and external ear normal.      Left Ear: Tympanic membrane, ear canal and external ear normal.      Nose: Nose normal.      Mouth/Throat:      Mouth: Mucous membranes are dry.      Pharynx: Oropharynx is clear.   Eyes:      Extraocular Movements: Extraocular movements intact.      Conjunctiva/sclera: Conjunctivae normal.      Pupils: Pupils are equal, round, and reactive to light.    Cardiovascular:      Rate and Rhythm: Normal rate and regular rhythm.      Pulses: Normal pulses.      Heart sounds: Normal heart sounds.   Pulmonary:      Effort: Pulmonary effort is normal.      Breath sounds: Normal breath sounds.   Abdominal:      General: Bowel sounds are normal.      Palpations: Abdomen is soft.   Musculoskeletal:         General: Normal range of motion.      Cervical back: Normal range of motion and neck supple.   Skin:     General: Skin is warm and dry.      Capillary Refill: Capillary refill takes less than 2 seconds.   Neurological:      General: No focal deficit present.      Mental Status: He is alert and oriented to person, place, and time.      GCS: GCS eye subscore is 4. GCS verbal subscore is 5. GCS motor subscore is 6.      Cranial Nerves: Cranial nerves 2-12 are intact.      Sensory: Sensation is intact.      Motor: Motor function is intact.      Coordination: Coordination is intact.      Gait: Gait is intact.      Deep Tendon Reflexes: Reflexes are normal and symmetric. Babinski sign absent on the right side. Babinski sign absent on the left side.      Reflex Scores:       Tricep reflexes are 2+ on the right side and 2+ on the left side.       Bicep reflexes are 2+ on the right side and 2+ on the left side.       Brachioradialis reflexes are 2+ on the right side and 2+ on the left side.       Patellar reflexes are 2+ on the right side and 2+ on the left side.       Achilles reflexes are 2+ on the right side and 2+ on the left side.        ED Course                 Procedures              EKG Interpretation:      Interpreted by Alfonso Mercado MD  Time reviewed: Time obtained 1720 time interpreted same sinus rhythm 90 bpm poor R wave progression, no acute ST-T wave changes normal axis, normal intervals.        Critical Care time:  was 37 minutes for this patient excluding procedures.           Results for orders placed or performed during the hospital encounter of 05/09/23  (from the past 24 hour(s))   [place] Draw    Narrative    The following orders were created for panel order [place] Draw.  Procedure                               Abnormality         Status                     ---------                               -----------         ------                     Extra Blue Top Tube[[id number]]                              Final result               Extra Red Top Tube[[id number]]                               Final result               Extra Green Top (Lithium...[[id number]]                      Final result               Extra Purple Top Tube[[id number]]                            Final result                 Please view results for these tests on the individual orders.   Extra Blue Top Tube   Result Value Ref Range    Hold Specimen jic    Extra Red Top Tube   Result Value Ref Range    Hold Specimen jic    Extra Green Top (Lithium Heparin) Tube   Result Value Ref Range    Hold Specimen jic    Extra Purple Top Tube   Result Value Ref Range    Hold Specimen jic    CBC with Platelets & Differential    Narrative    The following orders were created for panel order CBC with Platelets & Differential.  Procedure                               Abnormality         Status                     ---------                               -----------         ------                     CBC with platelets and d...[[id number]]                      Final result                 Please view results for these tests on the individual orders.   Basic metabolic panel   Result Value Ref Range    Sodium 140 136 - 145 mmol/L    Potassium 4.1 3.4 - 5.3 mmol/L    Chloride 106 98 - 107 mmol/L    Carbon Dioxide (CO2) 23 22 - 29 mmol/L    Anion Gap 11 7 - 15 mmol/L    Urea Nitrogen 15.9 8.0 - 23.0 mg/dL    Creatinine 0.93 0.67 - 1.17 mg/dL    Calcium 9.4 8.8 - 10.2 mg/dL    Glucose 93 70 - 99 mg/dL    GFR Estimate >90 >60 mL/min/1.73m2   INR   Result Value Ref Range    INR 0.93 0.85 - 1.15   Partial thromboplastin time    Result Value Ref Range    aPTT 24 22 - 38 Seconds   Troponin T, High Sensitivity   Result Value Ref Range    Troponin T, High Sensitivity 6 <=22 ng/L   CBC with platelets and differential   Result Value Ref Range    WBC Count 7.0 4.0 - 11.0 10e3/uL    RBC Count 4.75 4.40 - 5.90 10e6/uL    Hemoglobin 14.2 13.3 - 17.7 g/dL    Hematocrit 42.6 40.0 - 53.0 %    MCV 90 78 - 100 fL    MCH 29.9 26.5 - 33.0 pg    MCHC 33.3 31.5 - 36.5 g/dL    RDW 12.8 10.0 - 15.0 %    Platelet Count 271 150 - 450 10e3/uL    % Neutrophils 59 %    % Lymphocytes 29 %    % Monocytes 9 %    % Eosinophils 2 %    % Basophils 1 %    % Immature Granulocytes 0 %    NRBCs per 100 WBC 0 <1 /100    Absolute Neutrophils 4.0 1.6 - 8.3 10e3/uL    Absolute Lymphocytes 2.0 0.8 - 5.3 10e3/uL    Absolute Monocytes 0.7 0.0 - 1.3 10e3/uL    Absolute Eosinophils 0.2 0.0 - 0.7 10e3/uL    Absolute Basophils 0.1 0.0 - 0.2 10e3/uL    Absolute Immature Granulocytes 0.0 <=0.4 10e3/uL    Absolute NRBCs 0.0 10e3/uL   CTA Head Neck with Contrast    Narrative    EXAM: CTA HEAD NECK W CONTRAST  LOCATION: [facility]  DATE/TIME: 5/9/2023 5:59 PM CDT    INDICATION: Acute neuro deficit, stroke suspected  COMPARISON: None.  CONTRAST: 68mL Isovue 370  TECHNIQUE: Head and neck CT angiogram with IV contrast. Axial helical CT images of the head and neck vessels obtained during the arterial phase of intravenous contrast administration. Axial 2D reconstructed images and multiplanar 3D MIP reconstructed   images of the head and neck vessels were performed by the technologist. Dose reduction techniques were used. All stenosis measurements made according to NASCET criteria unless otherwise specified.    FINDINGS:    HEAD CTA:  ANTERIOR CIRCULATION: No proximal branch vessel occlusion or flow-limiting stenosis. No aneurysm or high-flow vascular malformation. Mild atherosclerosis at the cavernous left and clinoid bilateral ICA segments.     POSTERIOR  CIRCULATION: No proximal branch vessel occlusion or flow-limiting stenosis. No aneurysm or high-flow vascular malformation. Patent posterior communicating artery on the right.    DURAL VENOUS SINUSES: Expected enhancement of the major dural venous sinuses.    NECK CTA:  RIGHT CAROTID: Mild atherosclerosis without hemodynamically significant stenosis by NASCET criteria at the carotid bifurcation. No dissection.    LEFT CAROTID: Mild atherosclerosis without hemodynamically significant stenosis by NASCET criteria at the carotid bifurcation. No dissection.    VERTEBRAL ARTERIES: Codominant vertebral arteries. No flow-limiting stenosis or dissection.    AORTIC ARCH: Three-vessel aortic arch configuration. Mild atherosclerosis at left subclavian artery origin. No great vessel flow-limiting stenosis.    NONVASCULAR STRUCTURES: Lung apices are clear. No neck mass or lymphadenopathy. Thyroid gland is homogenous. No acute or aggressive appearing osseous abnormalities. Degenerative changes most notably at C5-C6 with severe disc space narrowing.      Impression    IMPRESSION:   HEAD CTA:  1.  No proximal branch vessel occlusion, flow-limiting stenosis, aneurysm, or vascular malformation.    NECK CTA:  1.  Mild atherosclerosis at the bilateral carotid bifurcations and left subclavian artery origin. No extracranial carotid or vertebral artery flow-limiting stenosis or findings of dissection.       MR Brain w/o & w Contrast    Narrative    EXAM: MR BRAIN W/O and W CONTRAST  LOCATION: [facility]  DATE/TIME: 5/9/2023 6:11 PM CDT    INDICATION: Acute neuro deficit, stroke suspected  COMPARISON: None.  CONTRAST: 12ml gadavist  TECHNIQUE: Routine multiplanar multisequence head MRI without and with intravenous contrast.    FINDINGS:    INTRACRANIAL CONTENTS: No diffusion restriction to suggest acute/subacute ischemia. No mass effect, acute hemorrhage, or extra-axial collection. Normal parenchymal signal for  age. Normal ventricles and sulci. Normal position of the cerebellar tonsils. No   pathologic contrast enhancement.    SELLA: No abnormality accounting for technique.    OSSEOUS STRUCTURES/SOFT TISSUES: Normal marrow signal. The major intracranial vascular flow voids are maintained.    ORBITS: No visible intraorbital abnormality.     SINUSES/MASTOIDS: No paranasal sinus mucosal disease. No middle ear or mastoid effusion.      Impression    IMPRESSION:  1.  No acute ischemia, mass/mass effect, or abnormal intracranial enhancement.           Medications   sodium chloride 0.9% infusion (has no administration in time range)   iopamidol (ISOVUE-370) solution 68 mL (68 mLs Intravenous $Given 5/9/23 1730)   sodium chloride 0.9 % bag 500 mL for CT scan flush use (100 mLs Intravenous $Given 5/9/23 1730)   gadobutrol (GADAVIST) injection 12 mL (12 mLs Intravenous $Given 5/9/23 2010)   7:43 PM  Remains headache free, still some numbness to the left side of his face as described in the HPI.  Lab diagnostics are all within normal limits, EKG showed no acute findings and is a normal EKG by my read, imaging including MRI with and without contrast of the head is read as normal per radiology.  CTA head neck is similarly reassuring with only minimal atherosclerosis as noted at the carotid bifurcations only.  No concerning findings.  Given the behavior of symptoms with the headache intermittently and the associated facial numbness I suspect that this patient most likely has a migraine variant and discussed this with him.  There are other considerations in the differential including TLE although my chief suspicion would be for migraine variant.  With this in mind I have encouraged him to use Tylenol/ibuprofen as pain medication at baseline, oxycodone was prescribed for breakthrough pain.  I would like him to follow-up with neurology for further discussion and potentially further evaluation.  If he experiences any significant worsening  or changes he is advised to return to the emergency department.           Assessments & Plan (with Medical Decision Making)   Assessments and plan with medical decision making at the time stamp above.      Disclaimer: This note consists of symbols derived from keyboarding, dictation and/or voice recognition software. As a result, there may be errors in the script that have gone undetected. Please consider this when interpreting information found in this chart.      I have reviewed the nursing notes.    I have reviewed the findings, diagnosis, plan and need for follow up with the patient.    New Prescriptions    OXYCODONE (ROXICODONE) 5 MG TABLET    Take 1 tablet (5 mg) by mouth every 6 hours as needed for pain       Final diagnoses:   Acute nonintractable headache, unspecified headache type   Left facial numbness   Migraine variant with headache - Suspected       5/9/2023   [facility] EMERGENCY DEPT     Alfonso Mercado MD  05/09/23 [id number]       Alfonso Mercado MD  05/12/23 [id number]

## 2023-05-09 NOTE — ED NOTES
MD at bedside, denies HA, no blurred vision, numbness, left side of face, for 3 wks, pt is alert and oriented

## 2023-05-09 NOTE — TELEPHONE ENCOUNTER
"  Reason for Disposition    Numbness of the face, arm or leg on one side of the body and new-onset    Answer Assessment - Initial Assessment Questions  1. LOCATION: \"Where does it hurt?\"       Left side of head over left sinuses above left eye; feels presssure  2. ONSET: \"When did the headache start?\" (Minutes, hours or days)       Off and on for 3 weeks  3. PATTERN: \"Does the pain come and go, or has it been constant since it started?\"      Off and on  4. SEVERITY: \"How bad is the pain?\" and \"What does it keep you from doing?\"  (e.g., Scale 1-10; mild, moderate, or severe)    - MILD (1-3): doesn't interfere with normal activities     - MODERATE (4-7): interferes with normal activities or awakens from sleep     - SEVERE (8-10): excruciating pain, unable to do any normal activities         mild  5. RECURRENT SYMPTOM: \"Have you ever had headaches before?\" If Yes, ask: \"When was the last time?\" and \"What happened that time?\"       Not asked  6. CAUSE: \"What do you think is causing the headache?\"      Pt believes may be sinuses but unsure.  7. MIGRAINE: \"Have you been diagnosed with migraine headaches?\" If Yes, ask: \"Is this headache similar?\"       unsure  8. HEAD INJURY: \"Has there been any recent injury to the head?\"       denies  9. OTHER SYMPTOMS: \"Do you have any other symptoms?\" (fever, stiff neck, eye pain, sore throat, cold symptoms)      Post nasal drainage and sinus pressure on left side of face.  New symptom 3 days of left side of face feels numb.  Denies radiating symptoms.    10. PREGNANCY: \"Is there any chance you are pregnant?\" \"When was your last menstrual period?\"        NA    Protocols used: HEADACHE-A-OH    "

## 2023-05-09 NOTE — TELEPHONE ENCOUNTER
Symptoms    Describe your symptoms: reports headache for the last 7 days also report numbness on left side    Any pain: Yes: HA    How long have you been having symptoms: 7 days      Home remedies tried:     Preferred Pharmacy:     [id number] - [facility] [address]   [address] [facility] [address]  Phone: [phone] Fax: [phone]          Could we send this information to you in [facility] or would you prefer to receive a phone call?:   Patient would prefer a phone call   Okay to leave a detailed message?: Yes at Home number on file [phone] (home)

## 2023-05-09 NOTE — ANESTHESIA PREPROCEDURE EVALUATION
Anesthesia Pre-Procedure Evaluation    Patient: Esdras Snider   MRN: [id number] : 1960        Procedure : Procedure(s):  COLONOSCOPY          Past Medical History:   Diagnosis Date     Acne      Ulcerative colitis (H)       No past surgical history on file.   No Known Allergies   Social History     Tobacco Use     Smoking status: Former     Packs/day: 1.00     Types: Cigarettes     Quit date:      Years since quittin.3     Smokeless tobacco: Never   Vaping Use     Vaping status: Never Used     Passive vaping exposure: Yes   Substance Use Topics     Alcohol use: Yes     Comment: Social use.      Wt Readings from Last 1 Encounters:   22 123.3 kg (271 lb 12.8 oz)        Anesthesia Evaluation   Pt has not had prior anesthetic         ROS/MED HX  ENT/Pulmonary:     (+) tobacco use, Past use,     Neurologic:       Cardiovascular:     (+) hypertension-----    METS/Exercise Tolerance:     Hematologic:       Musculoskeletal:       GI/Hepatic:     (+) Inflammatory bowel disease,     Renal/Genitourinary:       Endo:     (+) Obesity,     Psychiatric/Substance Use:       Infectious Disease:       Malignancy:       Other:            Physical Exam    Airway         TM distance: > 3 FB   Neck ROM: full   Mouth opening: > 3 cm    Respiratory Devices and Support         Dental       (+) Minor Abnormalities - some fillings, tiny chips      Cardiovascular          Rhythm and rate: regular and normal     Pulmonary           breath sounds clear to auscultation           OUTSIDE LABS:  CBC:   Lab Results   Component Value Date    WBC 8.3 2022    HGB 14.7 2022    HCT 44.1 2022     2022     BMP:   Lab Results   Component Value Date     2022    POTASSIUM 4.1 2022    CHLORIDE 103 2022    CO2 28 2022    BUN 13.1 2022    CR 1.05 2022    GLC 92 2022     (H) 2022     COAGS: No results found for: PTT, INR, FIBR  POC: No results found  for: BGM, HCG, HCGS  HEPATIC:   Lab Results   Component Value Date    ALBUMIN 4.8 11/07/2022    PROTTOTAL 8.0 11/07/2022    ALT 26 11/07/2022    AST 17 11/07/2022    ALKPHOS 62 11/07/2022    BILITOTAL 0.5 11/07/2022     OTHER:   Lab Results   Component Value Date    A1C 5.4 05/09/2022    LEO 9.8 11/07/2022    TSH 1.21 11/07/2022       Anesthesia Plan    ASA Status:  3   NPO Status:  NPO Appropriate    Anesthesia Type: General.     - Airway: Native airway   Induction: Propofol, Intravenous.   Maintenance: TIVA.        Consents    Anesthesia Plan(s) and associated risks, benefits, and realistic alternatives discussed. Questions answered and patient/representative(s) expressed understanding.     - Discussed: Risks, Benefits and Alternatives for BOTH SEDATION and the PROCEDURE were discussed     - Discussed with:  Patient      - Extended Intubation/Ventilatory Support Discussed: No.      - Patient is DNR/DNI Status: No    Use of blood products discussed: No .     Postoperative Care            Comments:                Gary Bullard, CRNA, APRN CRNA

## 2023-05-15 ENCOUNTER — ANESTHESIA (OUTPATIENT)
Dept: GASTROENTEROLOGY | Facility: CLINIC | Age: 63
End: 2023-05-15
Payer: COMMERCIAL

## 2023-05-15 ENCOUNTER — HOSPITAL ENCOUNTER (OUTPATIENT)
Facility: CLINIC | Age: 63
Discharge: HOME | End: 2023-05-15
Attending: SURGERY | Admitting: SURGERY
Payer: COMMERCIAL

## 2023-05-15 VITALS
TEMPERATURE: 97 F | HEART RATE: 85 BPM | BODY MASS INDEX: 37.94 KG/M2 | RESPIRATION RATE: 18 BRPM | HEIGHT: 71 IN | DIASTOLIC BLOOD PRESSURE: 75 MMHG | SYSTOLIC BLOOD PRESSURE: 107 MMHG | OXYGEN SATURATION: 96 % | WEIGHT: 271 LBS

## 2023-05-15 DIAGNOSIS — Z12.11 SPECIAL SCREENING FOR MALIGNANT NEOPLASMS, COLON: Primary | ICD-10-CM

## 2023-05-15 LAB — COLONOSCOPY: NORMAL

## 2023-05-15 PROCEDURE — 258N000003 HC RX IP 258 OP 636: Performed by: SURGERY

## 2023-05-15 PROCEDURE — 45380 COLONOSCOPY AND BIOPSY: CPT | Mod: PT | Performed by: SURGERY

## 2023-05-15 PROCEDURE — 250N000009 HC RX 250: Performed by: SURGERY

## 2023-05-15 PROCEDURE — 370N000017 HC ANESTHESIA TECHNICAL FEE, PER MIN: Performed by: SURGERY

## 2023-05-15 PROCEDURE — 88305 TISSUE EXAM BY PATHOLOGIST: CPT | Mod: TC | Performed by: SURGERY

## 2023-05-15 PROCEDURE — 250N000011 HC RX IP 250 OP 636: Performed by: NURSE ANESTHETIST, CERTIFIED REGISTERED

## 2023-05-15 PROCEDURE — 88305 TISSUE EXAM BY PATHOLOGIST: CPT | Mod: 26 | Performed by: PATHOLOGY

## 2023-05-15 RX ORDER — HYDROMORPHONE HCL IN WATER/PF 6 MG/30 ML
0.2 PATIENT CONTROLLED ANALGESIA SYRINGE INTRAVENOUS EVERY 5 MIN PRN
Status: DISCONTINUED | OUTPATIENT
Start: 2023-05-15 | End: 2023-05-15 | Stop reason: HOSPADM

## 2023-05-15 RX ORDER — LIDOCAINE 40 MG/G
CREAM TOPICAL
Status: DISCONTINUED | OUTPATIENT
Start: 2023-05-15 | End: 2023-05-15 | Stop reason: HOSPADM

## 2023-05-15 RX ORDER — HYDROMORPHONE HCL IN WATER/PF 6 MG/30 ML
0.4 PATIENT CONTROLLED ANALGESIA SYRINGE INTRAVENOUS EVERY 5 MIN PRN
Status: DISCONTINUED | OUTPATIENT
Start: 2023-05-15 | End: 2023-05-15 | Stop reason: HOSPADM

## 2023-05-15 RX ORDER — FENTANYL CITRATE 50 UG/ML
25 INJECTION, SOLUTION INTRAMUSCULAR; INTRAVENOUS EVERY 5 MIN PRN
Status: DISCONTINUED | OUTPATIENT
Start: 2023-05-15 | End: 2023-05-15 | Stop reason: HOSPADM

## 2023-05-15 RX ORDER — SODIUM CHLORIDE, SODIUM LACTATE, POTASSIUM CHLORIDE, CALCIUM CHLORIDE 600; 310; 30; 20 MG/100ML; MG/100ML; MG/100ML; MG/100ML
INJECTION, SOLUTION INTRAVENOUS CONTINUOUS
Status: DISCONTINUED | OUTPATIENT
Start: 2023-05-15 | End: 2023-05-15 | Stop reason: HOSPADM

## 2023-05-15 RX ORDER — FENTANYL CITRATE 50 UG/ML
50 INJECTION, SOLUTION INTRAMUSCULAR; INTRAVENOUS EVERY 5 MIN PRN
Status: DISCONTINUED | OUTPATIENT
Start: 2023-05-15 | End: 2023-05-15 | Stop reason: HOSPADM

## 2023-05-15 RX ORDER — OXYCODONE HYDROCHLORIDE 5 MG/1
10 TABLET ORAL
Status: DISCONTINUED | OUTPATIENT
Start: 2023-05-15 | End: 2023-05-15 | Stop reason: HOSPADM

## 2023-05-15 RX ORDER — OXYCODONE HYDROCHLORIDE 5 MG/1
5 TABLET ORAL
Status: DISCONTINUED | OUTPATIENT
Start: 2023-05-15 | End: 2023-05-15 | Stop reason: HOSPADM

## 2023-05-15 RX ORDER — PROPOFOL 10 MG/ML
INJECTION, EMULSION INTRAVENOUS CONTINUOUS PRN
Status: DISCONTINUED | OUTPATIENT
Start: 2023-05-15 | End: 2023-05-15

## 2023-05-15 RX ORDER — FENTANYL CITRATE 50 UG/ML
25 INJECTION, SOLUTION INTRAMUSCULAR; INTRAVENOUS
Status: DISCONTINUED | OUTPATIENT
Start: 2023-05-15 | End: 2023-05-15 | Stop reason: HOSPADM

## 2023-05-15 RX ORDER — ONDANSETRON 4 MG/1
4 TABLET, ORALLY DISINTEGRATING ORAL EVERY 30 MIN PRN
Status: DISCONTINUED | OUTPATIENT
Start: 2023-05-15 | End: 2023-05-15 | Stop reason: HOSPADM

## 2023-05-15 RX ORDER — ONDANSETRON 2 MG/ML
4 INJECTION INTRAMUSCULAR; INTRAVENOUS EVERY 30 MIN PRN
Status: DISCONTINUED | OUTPATIENT
Start: 2023-05-15 | End: 2023-05-15 | Stop reason: HOSPADM

## 2023-05-15 RX ADMIN — PROPOFOL 150 MCG/KG/MIN: 10 INJECTION, EMULSION INTRAVENOUS at 08:00

## 2023-05-15 RX ADMIN — LIDOCAINE HYDROCHLORIDE 50 ML: 10 INJECTION, SOLUTION EPIDURAL; INFILTRATION; INTRACAUDAL; PERINEURAL at 08:00

## 2023-05-15 RX ADMIN — SODIUM CHLORIDE, POTASSIUM CHLORIDE, SODIUM LACTATE AND CALCIUM CHLORIDE: 600; 310; 30; 20 INJECTION, SOLUTION INTRAVENOUS at 07:32

## 2023-05-15 ASSESSMENT — ACTIVITIES OF DAILY LIVING (ADL)
ADLS_ACUITY_SCORE: 35
ADLS_ACUITY_SCORE: 35

## 2023-05-15 NOTE — ANESTHESIA CARE TRANSFER NOTE
Patient: Esdras Snider    Procedure: Procedure(s):  COLONOSCOPY, WITH POLYPECTOMY AND BIOPSY       Diagnosis: Screen for colon cancer [Z12.11]  Diagnosis Additional Information: No value filed.    Anesthesia Type:   General     Note:    Oropharynx: oropharynx clear of all foreign objects and spontaneously breathing  Level of Consciousness: drowsy and awake  Oxygen Supplementation: room air    Independent Airway: airway patency satisfactory and stable  Dentition: dentition unchanged  Vital Signs Stable: post-procedure vital signs reviewed and stable  Report to RN Given: handoff report given  Patient transferred to: Phase II    Handoff Report: Identifed the Patient, Identified the Reponsible Provider, Reviewed the pertinent medical history, Discussed the surgical course, Reviewed Intra-OP anesthesia mangement and issues during anesthesia, Set expectations for post-procedure period and Allowed opportunity for questions and acknowledgement of understanding      Vitals:  Vitals Value Taken Time   /118 05/15/23 0845   Temp 36.1  C (97  F) 05/15/23 0830   Pulse 79 05/15/23 0845   Resp     SpO2 98 % 05/15/23 0845   Vitals shown include unvalidated device data.    Electronically Signed By: GRABIEL Bell CRNA  May 15, 2023  8:46 AM

## 2023-05-15 NOTE — INTERVAL H&P NOTE
I have reviewed the surgical (or preoperative) H&P that is linked to this encounter, and examined the patient. There are no significant changes    ?1st screening; hx of UC; no blood thinner; no famhx of colon cancer    Clinical Conditions Present on Arrival:  Clinically Significant Risk Factors Present on Admission

## 2023-05-15 NOTE — ANESTHESIA POSTPROCEDURE EVALUATION
Patient: Esdras Snider    Procedure: Procedure(s):  COLONOSCOPY, WITH POLYPECTOMY AND BIOPSY       Anesthesia Type:  General    Note:  Disposition: Outpatient   Postop Pain Control: Uneventful            Sign Out: Well controlled pain   PONV: No   Neuro/Psych: Uneventful            Sign Out: Acceptable/Baseline neuro status   Airway/Respiratory: Uneventful            Sign Out: Acceptable/Baseline resp. status   CV/Hemodynamics: Uneventful            Sign Out: Acceptable CV status; No obvious hypovolemia; No obvious fluid overload   Other NRE: NONE   DID A NON-ROUTINE EVENT OCCUR? No           Last vitals:  Vitals Value Taken Time   /118 05/15/23 0845   Temp 36.1  C (97  F) 05/15/23 0830   Pulse 79 05/15/23 0845   Resp     SpO2 95 % 05/15/23 0847   Vitals shown include unvalidated device data.    Electronically Signed By: GRABIEL Bell CRNA  May 15, 2023  8:47 AM

## 2023-05-15 NOTE — LETTER
Esdras Snider  [address] [facility] [address]  [facility] [place] [address]    May 21, 2023    Dear Esdras,  This letter is written to inform you of the results of your recent colonoscopy.  Your examination showed no abnormalities. As part of surveillance of ulcerative colitis, biospies were done throughout the colon as below.      Final Diagnosis   A(1). :Colon ascending   - Within normal limits     B(2). :Colon transverse  - Within normal limits     C(3). :Colon descending   - Within normal limits     D(4). :Sigmoid  - Within normal limits     E(5). :Rectum  - Within normal limits       All tissue were normal    Given these findings, your personal history of ulcerative colitis , I recommend that you undergo a repeat colonoscopy in 3 year(s) for surveillance. We will enter you into a recall system so you receive a reminder closer to the time that you are due for repeat examination.     Please remember that this recommendation is made with the understanding that you are not experiencing persistent changes in bowel function, bleeding per rectum, and/or significant abdominal pain. If you experience these symptoms, please contact your primary care provider for a further evaluation.     If you have any questions or concerns about the results of your colonoscopy or the appropriate follow-up, please contact my assistant at [phone].    Sincerely,        [facility], [facility]  ___

## 2023-05-16 LAB
PATH REPORT.COMMENTS IMP SPEC: NORMAL
PATH REPORT.COMMENTS IMP SPEC: NORMAL
PATH REPORT.FINAL DX SPEC: NORMAL
PATH REPORT.GROSS SPEC: NORMAL
PATH REPORT.MICROSCOPIC SPEC OTHER STN: NORMAL
PATH REPORT.RELEVANT HX SPEC: NORMAL
PHOTO IMAGE: NORMAL

## 2023-09-08 DIAGNOSIS — L70.0 ACNE VULGARIS: ICD-10-CM

## 2023-09-08 RX ORDER — MINOCYCLINE HYDROCHLORIDE 100 MG/1
100 CAPSULE ORAL 2 TIMES DAILY
Qty: 30 CAPSULE | Refills: 3 | Status: SHIPPED | OUTPATIENT
Start: 2023-09-08 | End: 2023-11-24

## 2023-11-13 ASSESSMENT — ENCOUNTER SYMPTOMS
FEVER: 0
FREQUENCY: 0
COUGH: 0
SORE THROAT: 0
JOINT SWELLING: 0
ARTHRALGIAS: 0
HEMATOCHEZIA: 0
NERVOUS/ANXIOUS: 0
DYSURIA: 0
DIARRHEA: 0
WEAKNESS: 0
NAUSEA: 0
MYALGIAS: 0
ABDOMINAL PAIN: 0
EYE PAIN: 0
PALPITATIONS: 0
HEARTBURN: 0
HEADACHES: 1
PARESTHESIAS: 0
CONSTIPATION: 0
SHORTNESS OF BREATH: 0
CHILLS: 0
HEMATURIA: 0
DIZZINESS: 0

## 2023-11-14 ENCOUNTER — OFFICE VISIT (OUTPATIENT)
Dept: FAMILY MEDICINE | Facility: CLINIC | Age: 63
End: 2023-11-14
Payer: COMMERCIAL

## 2023-11-14 VITALS
HEIGHT: 71 IN | RESPIRATION RATE: 18 BRPM | SYSTOLIC BLOOD PRESSURE: 136 MMHG | HEART RATE: 116 BPM | OXYGEN SATURATION: 97 % | DIASTOLIC BLOOD PRESSURE: 68 MMHG | WEIGHT: 256.6 LBS | BODY MASS INDEX: 35.92 KG/M2 | TEMPERATURE: 98.5 F

## 2023-11-14 DIAGNOSIS — K51.30 ULCERATIVE RECTOSIGMOIDITIS WITHOUT COMPLICATION (H): ICD-10-CM

## 2023-11-14 DIAGNOSIS — N52.9 VASCULOGENIC ERECTILE DYSFUNCTION, UNSPECIFIED VASCULOGENIC ERECTILE DYSFUNCTION TYPE: ICD-10-CM

## 2023-11-14 DIAGNOSIS — L98.9 SKIN LESION: ICD-10-CM

## 2023-11-14 DIAGNOSIS — Z13.220 SCREENING FOR HYPERLIPIDEMIA: ICD-10-CM

## 2023-11-14 DIAGNOSIS — R53.83 LOW ENERGY: ICD-10-CM

## 2023-11-14 DIAGNOSIS — Z00.00 ROUTINE GENERAL MEDICAL EXAMINATION AT A HEALTH CARE FACILITY: Primary | ICD-10-CM

## 2023-11-14 DIAGNOSIS — E66.01 MORBID OBESITY (H): ICD-10-CM

## 2023-11-14 PROBLEM — I10 HYPERTENSION, UNSPECIFIED TYPE: Status: RESOLVED | Noted: 2020-01-27 | Resolved: 2023-11-14

## 2023-11-14 PROBLEM — E66.09 CLASS 2 OBESITY DUE TO EXCESS CALORIES WITHOUT SERIOUS COMORBIDITY WITH BODY MASS INDEX (BMI) OF 36.0 TO 36.9 IN ADULT: Status: ACTIVE | Noted: 2023-11-14

## 2023-11-14 PROBLEM — E66.812 CLASS 2 OBESITY DUE TO EXCESS CALORIES WITHOUT SERIOUS COMORBIDITY WITH BODY MASS INDEX (BMI) OF 36.0 TO 36.9 IN ADULT: Status: ACTIVE | Noted: 2023-11-14

## 2023-11-14 LAB
CHOLEST SERPL-MCNC: 236 MG/DL
HDLC SERPL-MCNC: 43 MG/DL
LDLC SERPL CALC-MCNC: 157 MG/DL
NONHDLC SERPL-MCNC: 193 MG/DL
TRIGL SERPL-MCNC: 179 MG/DL

## 2023-11-14 PROCEDURE — 82607 VITAMIN B-12: CPT | Performed by: NURSE PRACTITIONER

## 2023-11-14 PROCEDURE — 82306 VITAMIN D 25 HYDROXY: CPT | Performed by: NURSE PRACTITIONER

## 2023-11-14 PROCEDURE — 36415 COLL VENOUS BLD VENIPUNCTURE: CPT | Performed by: NURSE PRACTITIONER

## 2023-11-14 PROCEDURE — 80061 LIPID PANEL: CPT | Performed by: NURSE PRACTITIONER

## 2023-11-14 PROCEDURE — 99396 PREV VISIT EST AGE 40-64: CPT | Performed by: NURSE PRACTITIONER

## 2023-11-14 PROCEDURE — 99214 OFFICE O/P EST MOD 30 MIN: CPT | Mod: 25 | Performed by: NURSE PRACTITIONER

## 2023-11-14 RX ORDER — HYDROXYZINE HYDROCHLORIDE 25 MG/1
TABLET, FILM COATED ORAL
COMMUNITY
Start: 2022-11-21 | End: 2023-11-14

## 2023-11-14 RX ORDER — SILDENAFIL 25 MG/1
25-100 TABLET, FILM COATED ORAL DAILY PRN
Qty: 16 TABLET | Refills: 3 | Status: SHIPPED | OUTPATIENT
Start: 2023-11-14

## 2023-11-14 RX ORDER — TOPIRAMATE 25 MG/1
TABLET, FILM COATED ORAL
COMMUNITY
Start: 2023-11-06

## 2023-11-14 RX ORDER — INDOMETHACIN 50 MG/1
CAPSULE ORAL
COMMUNITY
Start: 2023-10-19 | End: 2023-11-14

## 2023-11-14 ASSESSMENT — ENCOUNTER SYMPTOMS
NERVOUS/ANXIOUS: 0
NAUSEA: 0
CHILLS: 0
DIARRHEA: 0
SHORTNESS OF BREATH: 0
DYSURIA: 0
FEVER: 0
MYALGIAS: 0
DIZZINESS: 0
COUGH: 0
JOINT SWELLING: 0
FREQUENCY: 0
EYE PAIN: 0
ABDOMINAL PAIN: 0
PARESTHESIAS: 0
CONSTIPATION: 0
HEARTBURN: 0
ARTHRALGIAS: 0
WEAKNESS: 0
HEMATURIA: 0
SORE THROAT: 0
PALPITATIONS: 0
HEMATOCHEZIA: 0
HEADACHES: 1

## 2023-11-14 ASSESSMENT — PAIN SCALES - GENERAL: PAINLEVEL: NO PAIN (0)

## 2023-11-14 NOTE — PROGRESS NOTES
SUBJECTIVE:   CC: Esdras is an 63 year old who presents for preventative health visit.       2023     3:36 PM   Additional Questions   Roomed by megha   Accompanied by shingles         2023     3:36 PM   Patient Reported Additional Medications   Patient reports taking the following new medications none     Healthy Habits:     Getting at least 3 servings of Calcium per day:  Yes    Bi-annual eye exam:  Yes    Dental care twice a year:  Yes    Sleep apnea or symptoms of sleep apnea:  None    Diet:  Other    Frequency of exercise:  None    Taking medications regularly:  Yes    Medication side effects:  None    Additional concerns today:  No    [facility]     Today's PHQ-2 Score:       2023    11:26 AM   PHQ-2 (  [employer])   Q1: Little interest or pleasure in doing things 0   Q2: Feeling down, depressed or hopeless 0   PHQ-2 Score 0   Q1: Little interest or pleasure in doing things Not at all   Q2: Feeling down, depressed or hopeless Not at all   PHQ-2 Score 0       Social History     Tobacco Use    Smoking status: Former     Packs/day: 1     Types: Cigarettes     Quit date:      Years since quittin.8    Smokeless tobacco: Never   Substance Use Topics    Alcohol use: Yes     Comment: Social use.         2023    11:25 AM   Alcohol Use   Prescreen: >3 drinks/day or >7 drinks/week? No     Last PSA:   Prostate Specific Antigen Screen   Date Value Ref Range Status   2022 0.92 0.00 - 4.00 ug/L Final       Reviewed orders with patient. Reviewed health maintenance and updated orders accordingly - Yes  Lab work is in process  Labs reviewed in EPIC  BP Readings from Last 3 Encounters:   23 136/68   05/15/23 107/75   23 (!) 164/149    Wt Readings from Last 3 Encounters:   23 116.4 kg (256 lb 9.6 oz)   05/15/23 122.9 kg (271 lb)   22 123.3 kg (271 lb 12.8 oz)                  Patient Active Problem List   Diagnosis    Ulcerative  rectosigmoiditis without complication (H)    Morbid obesity (H)    Class 2 obesity due to excess calories without serious comorbidity with body mass index (BMI) of 36.0 to 36.9 in adult     Past Surgical History:   Procedure Laterality Date    COLONOSCOPY N/A 05/15/2023    Procedure: COLONOSCOPY, WITH POLYPECTOMY AND BIOPSY;  Surgeon: Kasey Maria MD;  Location: [facility]    FOOT SURGERY Left        Social History     Tobacco Use    Smoking status: Former     Packs/day: 1     Types: Cigarettes     Quit date:      Years since quittin.8    Smokeless tobacco: Never   Substance Use Topics    Alcohol use: Yes     Comment: Social use.     Family History   Problem Relation Age of Onset    No Known Problems Mother     No Known Problems Father     No Known Problems Sister     No Known Problems Sister     No Known Problems Brother     Diabetes Maternal Grandmother     No Known Problems Maternal Grandfather     No Known Problems Paternal Grandmother     No Known Problems Paternal Grandfather     Crohn's Disease Son     No Known Problems Son          Current Outpatient Medications   Medication Sig Dispense Refill    minocycline (MINOCIN) 100 MG capsule Take 1 capsule (100 mg) by mouth 2 times daily 30 capsule 3    sildenafil (VIAGRA) 25 MG tablet Take 1-4 tablets ( mg) by mouth daily as needed (erectile dysfunction) 16 tablet 3    topiramate (TOPAMAX) 25 MG tablet TAKE 1 TABLET BY MOUTH AT BEDTIME FOR 7 DAYS THEN TAKE 1 TABLET TWICE DAILY FOR 7 DAYS THEN TAKE 1 TABLET IN THE MORNING AND 2 TABLETS IN THE EVENING FOR 7 DAYS THEN TAKE 2 TABLETS TWICE DAILY       No Known Allergies    Reviewed and updated as needed this visit by clinical staff   Tobacco  Allergies  Meds  Problems  Med Hx  Surg Hx  Fam Hx  Soc   Hx        Reviewed and updated as needed this visit by Provider   Tobacco  Allergies  Meds  Problems  Med Hx  Surg Hx  Fam Hx  Soc   Hx       Past Medical History:   Diagnosis Date    Acne      "Arthritis 2022    UC (ulcerative colitis) (H)     Ulcerative colitis (H)       Past Surgical History:   Procedure Laterality Date    COLONOSCOPY N/A 05/15/2023    Procedure: COLONOSCOPY, WITH POLYPECTOMY AND BIOPSY;  Surgeon: Kasey Maria MD;  Location: [facility]    FOOT SURGERY Left        Review of Systems   Constitutional:  Negative for chills and fever.   HENT:  Negative for congestion, ear pain, hearing loss and sore throat.    Eyes:  Negative for pain and visual disturbance.   Respiratory:  Negative for cough and shortness of breath.    Cardiovascular:  Negative for chest pain, palpitations and peripheral edema.   Gastrointestinal:  Negative for abdominal pain, constipation, diarrhea, heartburn, hematochezia and nausea.   Genitourinary:  Negative for dysuria, frequency, genital sores, hematuria, impotence, penile discharge and urgency.   Musculoskeletal:  Negative for arthralgias, joint swelling and myalgias.   Skin:  Negative for rash.   Neurological:  Positive for headaches. Negative for dizziness, weakness and paresthesias.   Psychiatric/Behavioral:  Negative for mood changes. The patient is not nervous/anxious.      Following with neurology for new onset headache.    OBJECTIVE:   /68   Pulse 116   Temp 98.5  F (36.9  C) (Tympanic)   Resp 18   Ht 1.797 m (5' 10.75\")   Wt 116.4 kg (256 lb 9.6 oz)   SpO2 97%   BMI 36.04 kg/m      Physical Exam  GENERAL: healthy, alert and no distress  EYES: Eyes grossly normal to inspection, PERRL and conjunctivae and sclerae normal  HENT: ear canals and TM's normal, nose and mouth without ulcers or lesions  NECK: no adenopathy, no asymmetry, masses, or scars and thyroid normal to palpation  RESP: lungs clear to auscultation - no rales, rhonchi or wheezes  CV: regular rate and rhythm, normal S1 S2, no S3 or S4, no murmur, click or rub, no peripheral edema and peripheral pulses strong  ABDOMEN: soft, nontender, no hepatosplenomegaly, no masses and bowel sounds " normal  MS: no gross musculoskeletal defects noted, no edema  SKIN: no suspicious lesions or rashes  NEURO: Normal strength and tone, mentation intact and speech normal  PSYCH: mentation appears normal, affect normal/bright    Diagnostic Test Results:  Labs reviewed in Epic    ASSESSMENT/PLAN:   (Z00.00) Routine general medical examination at a health care facility  (primary encounter diagnosis)  Comment: Up to date on preventative care and screenings. Declined influenza and covid vaccinations. Discussed checking with inusrance regarding coverage before receiving shingles and RSV vaccinations. Reviewed preventative recommendations and advised follow-up in one year for annual preventative exam.  Plan: REVIEW OF HEALTH MAINTENANCE PROTOCOL ORDERS,         PRIMARY CARE FOLLOW-UP SCHEDULING, Lipid panel         reflex to direct LDL Non-fasting    (Z13.220) Screening for hyperlipidemia  Comment: Lipids elevated last year. Ordered lipid panel to check for hyperlipidemia.  Plan: Lipid panel reflex to direct LDL Non-fasting    (E66.01) Morbid obesity (H)  Comment: Discussed diet and exercise recommendations.    (K51.30) Ulcerative rectosigmoiditis without complication (H)  Comment: Stable, reports he has not had any symptoms of his ulcerative colitis for years.    (L98.9) Skin lesion  Comment: Skin tag to right chin. Ordered referral to dermatology for skin tag removal.  Plan: Adult Dermatology  Referral      (N52.9) Vasculogenic erectile dysfunction, unspecified vasculogenic erectile dysfunction type  Comment: Taking sildenafil  mg as needed. Refilled sildenafil.  Plan: sildenafil (VIAGRA) 25 MG tablet     (R53.83) Low energy  Comment: Ordered vitamin B 12 and vitamin D to screen for deficiencies that could be causing low energy.  Plan: Vitamin B12, Vitamin D Deficiency      See Patient Instructions    Patient has been advised of split billing requirements and indicates understanding: Yes    COUNSELING:  "  Reviewed preventive health counseling, as reflected in patient instructions       Regular exercise       Healthy diet/nutrition    BMI:   Estimated body mass index is 36.04 kg/m  as calculated from the following:    Height as of this encounter: 1.797 m (5' 10.75\").    Weight as of this encounter: 116.4 kg (256 lb 9.6 oz).   Weight management plan: Patient referred to endocrine and/or weight management specialty    He reports that he quit smoking about 18 years ago. His smoking use included cigarettes. He smoked an average of 1 pack per day. He has never used smokeless tobacco.    Cheryl AREVALO I, Sherice Alfredo, was present with the NP student who participated in the service and in the documentationof the note.   I have verified the history and personally performed the physical exam and medical decision making.  I agree with the assessment and plan of care as documented in the note.     Sherice Alfredo NP on 11/16/2023 at 7:40 AM    [facility]  "

## 2023-11-15 LAB
VIT B12 SERPL-MCNC: 1128 PG/ML (ref 232–1245)
VIT D+METAB SERPL-MCNC: 23 NG/ML (ref 20–50)

## 2023-11-16 DIAGNOSIS — E78.5 HYPERLIPIDEMIA LDL GOAL <100: Primary | ICD-10-CM

## 2023-11-16 RX ORDER — ATORVASTATIN CALCIUM 20 MG/1
20 TABLET, FILM COATED ORAL DAILY
Qty: 90 TABLET | Refills: 3 | Status: SHIPPED | OUTPATIENT
Start: 2023-11-16

## 2023-11-24 ENCOUNTER — MYC REFILL (OUTPATIENT)
Dept: FAMILY MEDICINE | Facility: CLINIC | Age: 63
End: 2023-11-24
Payer: COMMERCIAL

## 2023-11-24 DIAGNOSIS — L70.0 ACNE VULGARIS: ICD-10-CM

## 2023-11-27 NOTE — TELEPHONE ENCOUNTER
"Requested Prescriptions   Pending Prescriptions Disp Refills    minocycline (MINOCIN) 100 MG capsule 60 capsule 3     Sig: Take 1 capsule (100 mg) by mouth 2 times daily       Oral Acne/Rosacea Medications Protocol Failed - 11/24/2023  6:27 PM        Failed - Confirmation of diagnosis is required     Please confirm diagnosis is acne or rosacea.     If NOT acne or rosacea; refer request to provider for further evaluation.    If diagnosis IS acne or rosacea, OK to refill BASED ON PREVIOUS REFILL CLINICAL NOTE RECOMMENDATION.          Passed - Patient is 12 years of age or older        Passed - Recent (12 mo) or future (30 days) visit within the authorizing provider's specialty     Patient has had an office visit with the authorizing provider or a provider within the authorizing providers department within the previous 12 mos or has a future within next 30 days. See \"Patient Info\" tab in inbasket, or \"Choose Columns\" in Meds & Orders section of the refill encounter.              Passed - Medication is active on med list             "

## 2023-11-29 ENCOUNTER — MYC REFILL (OUTPATIENT)
Dept: FAMILY MEDICINE | Facility: CLINIC | Age: 63
End: 2023-11-29
Payer: COMMERCIAL

## 2023-11-29 DIAGNOSIS — L70.0 ACNE VULGARIS: ICD-10-CM

## 2023-11-30 DIAGNOSIS — L70.0 ACNE VULGARIS: ICD-10-CM

## 2023-11-30 RX ORDER — MINOCYCLINE HYDROCHLORIDE 100 MG/1
100 CAPSULE ORAL 2 TIMES DAILY
Qty: 30 CAPSULE | Refills: 3 | OUTPATIENT
Start: 2023-11-30

## 2023-11-30 RX ORDER — MINOCYCLINE HYDROCHLORIDE 100 MG/1
100 CAPSULE ORAL 2 TIMES DAILY
Qty: 60 CAPSULE | Refills: 3 | Status: SHIPPED | OUTPATIENT
Start: 2023-11-30 | End: 2024-04-25

## 2023-11-30 NOTE — TELEPHONE ENCOUNTER
Pending Prescriptions:                       Disp   Refills    minocycline (MINOCIN) 100 MG capsule      60 cap*3            Sig: Take 1 capsule (100 mg) by mouth 2 times daily

## 2023-12-01 RX ORDER — MINOCYCLINE HYDROCHLORIDE 100 MG/1
100 CAPSULE ORAL 2 TIMES DAILY
Qty: 60 CAPSULE | Refills: 3 | OUTPATIENT
Start: 2023-12-01

## 2024-04-25 DIAGNOSIS — L70.0 ACNE VULGARIS: ICD-10-CM

## 2024-04-26 RX ORDER — MINOCYCLINE HYDROCHLORIDE 100 MG/1
100 CAPSULE ORAL 2 TIMES DAILY
Qty: 60 CAPSULE | Refills: 3 | Status: SHIPPED | OUTPATIENT
Start: 2024-04-26 | End: 2024-09-23

## 2024-04-26 NOTE — TELEPHONE ENCOUNTER
Pending Prescriptions:                       Disp   Refills    minocycline (MINOCIN) 100 MG capsule       60 cap*3        Sig: Take 1 capsule (100 mg) by mouth 2 times daily    Routing refill request to provider for review/approval because:  Oral Acne/Rosacea Medications Protocol Rexyxy3704/25/2024 01:50 PM   Protocol Details   Confirmation of diagnosis is required    Patient is 12 years of age or older    Medication is active on med list    Recent (12 month) or future (90 days) visit with authorizing provider s specialty    Medication indicated for associated diagnosis     Mame Stanford RN  [facility]

## 2024-09-21 DIAGNOSIS — L70.0 ACNE VULGARIS: ICD-10-CM

## 2024-09-23 RX ORDER — MINOCYCLINE HYDROCHLORIDE 100 MG/1
100 CAPSULE ORAL 2 TIMES DAILY
Qty: 60 CAPSULE | Refills: 0 | Status: SHIPPED | OUTPATIENT
Start: 2024-09-23

## 2024-11-18 NOTE — PATIENT INSTRUCTIONS
Patient Education   Preventive Care Advice   This is general advice given by our system to help you stay healthy. However, your care team may have specific advice just for you. Please talk to your care team about your preventive care needs.  Nutrition  Eat 5 or more servings of fruits and vegetables each day.  Try wheat bread, brown rice and whole grain pasta (instead of white bread, rice, and pasta).  Get enough calcium and vitamin D. Check the label on foods and aim for 100% of the RDA (recommended daily allowance).  Lifestyle  Exercise at least 150 minutes each week  (30 minutes a day, 5 days a week).  Do muscle strengthening activities 2 days a week. These help control your weight and prevent disease.  No smoking.  Wear sunscreen to prevent skin cancer.  Have a dental exam and cleaning every 6 months.  Yearly exams  See your health care team every year to talk about:  Any changes in your health.  Any medicines your care team has prescribed.  Preventive care, family planning, and ways to prevent chronic diseases.  Shots (vaccines)   HPV shots (up to age 26), if you've never had them before.  Hepatitis B shots (up to age 59), if you've never had them before.  COVID-19 shot: Get this shot when it's due.  Flu shot: Get a flu shot every year.  Tetanus shot: Get a tetanus shot every 10 years.  Pneumococcal, hepatitis A, and RSV shots: Ask your care team if you need these based on your risk.  Shingles shot (for age 50 and up)  General health tests  Diabetes screening:  Starting at age 35, Get screened for diabetes at least every 3 years.  If you are younger than age 35, ask your care team if you should be screened for diabetes.  Cholesterol test: At age 39, start having a cholesterol test every 5 years, or more often if advised.  Bone density scan (DEXA): At age 50, ask your care team if you should have this scan for osteoporosis (brittle bones).  Hepatitis C: Get tested at least once in your life.  STIs (sexually  transmitted infections)  Before age 24: Ask your care team if you should be screened for STIs.  After age 24: Get screened for STIs if you're at risk. You are at risk for STIs (including HIV) if:  You are sexually active with more than one person.  You don't use condoms every time.  You or a partner was diagnosed with a sexually transmitted infection.  If you are at risk for HIV, ask about PrEP medicine to prevent HIV.  Get tested for HIV at least once in your life, whether you are at risk for HIV or not.  Cancer screening tests  Cervical cancer screening: If you have a cervix, begin getting regular cervical cancer screening tests starting at age 21.  Breast cancer scan (mammogram): If you've ever had breasts, begin having regular mammograms starting at age 40. This is a scan to check for breast cancer.  Colon cancer screening: It is important to start screening for colon cancer at age 45.  Have a colonoscopy test every 10 years (or more often if you're at risk) Or, ask your provider about stool tests like a FIT test every year or Cologuard test every 3 years.  To learn more about your testing options, visit:   .  For help making a decision, visit:   https://bit.ly/fh81586.  Prostate cancer screening test: If you have a prostate, ask your care team if a prostate cancer screening test (PSA) at age 55 is right for you.  Lung cancer screening: If you are a current or former smoker ages 50 to 80, ask your care team if ongoing lung cancer screenings are right for you.  For informational purposes only. Not to replace the advice of your health care provider. Copyright   2023 [facility]. All rights reserved. Clinically reviewed by the [facility]. MetaNotes [id number] - REV 01/24.       High Cholesterol     We think about cholesterol differently than we did several years ago.     1. The first goal is to improve your lifestyle.  Do not smoke. Exercise for 20-30 minutes most days of the  weeks.  Follow a healthy diet with at least 5 servings of fresh, whole fruits and vegetables per day, whole grains, nuts, seeds, legumes, high fiber foods, limit sugar and processed foods, drink water.  Good article on healthy eating     [email or web address]/03/[email or web address]-conversation-[email or web address]-healthy-eating-and-nutrition.html

## 2024-11-19 ENCOUNTER — OFFICE VISIT (OUTPATIENT)
Dept: FAMILY MEDICINE | Facility: CLINIC | Age: 64
End: 2024-11-19
Payer: COMMERCIAL

## 2024-11-19 DIAGNOSIS — R20.0 FACIAL NUMBNESS: ICD-10-CM

## 2024-11-19 DIAGNOSIS — Z12.5 SCREENING FOR PROSTATE CANCER: ICD-10-CM

## 2024-11-19 DIAGNOSIS — L70.0 ACNE VULGARIS: ICD-10-CM

## 2024-11-19 DIAGNOSIS — E78.5 HYPERLIPIDEMIA LDL GOAL <100: ICD-10-CM

## 2024-11-19 DIAGNOSIS — L98.9 SKIN LESION: ICD-10-CM

## 2024-11-19 DIAGNOSIS — Z00.00 ROUTINE GENERAL MEDICAL EXAMINATION AT A HEALTH CARE FACILITY: Primary | ICD-10-CM

## 2024-11-19 DIAGNOSIS — N52.9 VASCULOGENIC ERECTILE DYSFUNCTION, UNSPECIFIED VASCULOGENIC ERECTILE DYSFUNCTION TYPE: ICD-10-CM

## 2024-11-19 DIAGNOSIS — Z13.220 SCREENING FOR HYPERLIPIDEMIA: ICD-10-CM

## 2024-11-19 PROBLEM — E66.01 MORBID OBESITY (H): Status: RESOLVED | Noted: 2020-12-10 | Resolved: 2024-11-19

## 2024-11-19 LAB
ALBUMIN SERPL BCG-MCNC: 4.3 G/DL (ref 3.5–5.2)
ALP SERPL-CCNC: 58 U/L (ref 40–150)
ALT SERPL W P-5'-P-CCNC: 33 U/L (ref 0–70)
ANION GAP SERPL CALCULATED.3IONS-SCNC: 9 MMOL/L (ref 7–15)
AST SERPL W P-5'-P-CCNC: 22 U/L (ref 0–45)
BILIRUB SERPL-MCNC: 0.3 MG/DL
BUN SERPL-MCNC: 13.6 MG/DL (ref 8–23)
CALCIUM SERPL-MCNC: 9.4 MG/DL (ref 8.8–10.4)
CHLORIDE SERPL-SCNC: 103 MMOL/L (ref 98–107)
CHOLEST SERPL-MCNC: 207 MG/DL
CREAT SERPL-MCNC: 1.02 MG/DL (ref 0.67–1.17)
EGFRCR SERPLBLD CKD-EPI 2021: 83 ML/MIN/1.73M2
FASTING STATUS PATIENT QL REPORTED: ABNORMAL
FASTING STATUS PATIENT QL REPORTED: NORMAL
GLUCOSE SERPL-MCNC: 95 MG/DL (ref 70–99)
HCO3 SERPL-SCNC: 28 MMOL/L (ref 22–29)
HDLC SERPL-MCNC: 41 MG/DL
LDLC SERPL CALC-MCNC: 137 MG/DL
NONHDLC SERPL-MCNC: 166 MG/DL
POTASSIUM SERPL-SCNC: 4 MMOL/L (ref 3.4–5.3)
PROT SERPL-MCNC: 7.1 G/DL (ref 6.4–8.3)
PSA SERPL DL<=0.01 NG/ML-MCNC: 0.85 NG/ML (ref 0–4.5)
SODIUM SERPL-SCNC: 140 MMOL/L (ref 135–145)
TRIGL SERPL-MCNC: 143 MG/DL

## 2024-11-19 PROCEDURE — G0103 PSA SCREENING: HCPCS | Performed by: NURSE PRACTITIONER

## 2024-11-19 PROCEDURE — 82607 VITAMIN B-12: CPT | Performed by: NURSE PRACTITIONER

## 2024-11-19 PROCEDURE — 80053 COMPREHEN METABOLIC PANEL: CPT | Performed by: NURSE PRACTITIONER

## 2024-11-19 PROCEDURE — 36415 COLL VENOUS BLD VENIPUNCTURE: CPT | Performed by: NURSE PRACTITIONER

## 2024-11-19 PROCEDURE — 80061 LIPID PANEL: CPT | Performed by: NURSE PRACTITIONER

## 2024-11-19 RX ORDER — MINOCYCLINE HYDROCHLORIDE 100 MG/1
100 CAPSULE ORAL 2 TIMES DAILY
Qty: 60 CAPSULE | Refills: 11 | Status: SHIPPED | OUTPATIENT
Start: 2024-11-19

## 2024-11-19 RX ORDER — SILDENAFIL 25 MG/1
25-100 TABLET, FILM COATED ORAL DAILY PRN
Qty: 16 TABLET | Refills: 3 | Status: SHIPPED | OUTPATIENT
Start: 2024-11-19

## 2024-11-19 SDOH — HEALTH STABILITY: PHYSICAL HEALTH: ON AVERAGE, HOW MANY DAYS PER WEEK DO YOU ENGAGE IN MODERATE TO STRENUOUS EXERCISE (LIKE A BRISK WALK)?: 1 DAY

## 2024-11-19 SDOH — HEALTH STABILITY: PHYSICAL HEALTH: ON AVERAGE, HOW MANY MINUTES DO YOU ENGAGE IN EXERCISE AT THIS LEVEL?: 20 MIN

## 2024-11-19 ASSESSMENT — PAIN SCALES - GENERAL: PAINLEVEL_OUTOF10: NO PAIN (0)

## 2024-11-19 ASSESSMENT — SOCIAL DETERMINANTS OF HEALTH (SDOH): HOW OFTEN DO YOU GET TOGETHER WITH FRIENDS OR RELATIVES?: PATIENT DECLINED

## 2024-11-19 NOTE — PROGRESS NOTES
Preventive Care Visit  [facility]  Sherice Alfredo NP, Family Medicine  Nov 19, 2024    Assessment & Plan     Routine general medical examination at a health care facility  Patient declines shingles vaccination today otherwise is up-to-date on all his screenings.  I did order labs for cholesterol and prostate cancer screening today.  Clinical history addressed below for medication refills.  Reviewed preventive health recommendations and advised follow-up in 1 year for annual physical exam.  - REVIEW OF HEALTH MAINTENANCE PROTOCOL ORDERS    Screening for hyperlipidemia  - Lipid panel reflex to direct LDL Non-fasting; Future  - Lipid panel reflex to direct LDL Non-fasting    Screening for prostate cancer  - PSA, screen; Future  - PSA, screen    Acne vulgaris  Exam shows minimal back acne today.  He has been taking this regularly daily.  Ordered comprehensive metabolic panel to check for electrolytes, kidney function and liver function on regular use of the minutes likely.  Refilled minocycline for 1 year.  - Comprehensive metabolic panel (BMP + Alb, Alk Phos, ALT, AST, Total. Bili, TP); Future  - minocycline (MINOCIN) 100 MG capsule; Take 1 capsule (100 mg) by mouth 2 times daily.  - Comprehensive metabolic panel (BMP + Alb, Alk Phos, ALT, AST, Total. Bili, TP)    Vasculogenic erectile dysfunction, unspecified vasculogenic erectile dysfunction type  Stable.  Refilled Viagra for 1 year.  - sildenafil (VIAGRA) 25 MG tablet; Take 1-4 tablets ( mg) by mouth daily as needed (erectile dysfunction).    Skin lesion  Patient has multiple moles and SKs on his torso and back and arms.  I am referring him to dermatology for a full skin check evaluation.  - Adult Dermatology  Referral; Future    Facial numbness  Patient started to have facial numbness back when he had a migraine headache that was a rule out stroke about a year ago.  He continues to have some mild numbness in the face  "which is neurologist related did not address.  I will check a vitamin B12 today since this deficiency can cause some of the symptoms as well  - Vitamin B12; Future  - Vitamin B12    Patient has been advised of split billing requirements and indicates understanding: Yes        BMI  Estimated body mass index is 35.76 kg/m  as calculated from the following:    Height as of this encounter: 1.8 m (5' 10.87\").    Weight as of this encounter: 115.8 kg (255 lb 6.4 oz).   Weight management plan: Discussed healthy diet and exercise guidelines    Counseling  Appropriate preventive services were addressed with this patient via screening, questionnaire, or discussion as appropriate for fall prevention, nutrition, physical activity, Tobacco-use cessation, social engagement, weight loss and cognition.  Checklist reviewing preventive services available has been given to the patient.  Reviewed patient's diet, addressing concerns and/or questions.   He is at risk for lack of exercise and has been provided with information to increase physical activity for the benefit of his well-being.       See Patient Instructions    Raghu Dewitt is a 64 year old, presenting for the following:  Physical        11/19/2024     3:01 PM   Additional Questions   Roomed by rmb   Accompanied by self         11/19/2024     3:01 PM   Patient Reported Additional Medications   Patient reports taking the following new medications none          HPI    Health Care Directive  Patient does not have a Health Care Directive: Discussed advance care planning with patient; information given to patient to review.        11/19/2024   General Health   How would you rate your overall physical health? Good   Feel stress (tense, anxious, or unable to sleep) Not at all            11/19/2024   Nutrition   Three or more servings of calcium each day? (!) NO   Diet: Regular (no restrictions)   How many servings of fruit and vegetables per day? (!) 0-1   How many sweetened " beverages each day? 0-1            2024   Exercise   Days per week of moderate/strenous exercise 1 day   Average minutes spent exercising at this level 20 min      (!) EXERCISE CONCERN      2024   Social Factors   Frequency of gathering with friends or relatives Patient declined   Worry food won't last until get money to buy more Patient declined   Food not last or not have enough money for food? Patient declined   Do you have housing? (Housing is defined as stable permanent housing and does not include staying ouside in a car, in a tent, in an abandoned building, in an overnight shelter, or couch-surfing.) Patient declined   Are you worried about losing your housing? Patient declined   Lack of transportation? Patient declined   Unable to get utilities (heat,electricity)? Patient declined            2024   Fall Risk   Fallen 2 or more times in the past year? No    Trouble with walking or balance? No        Patient-reported          2024   Dental   Dentist two times every year? Yes            2024   TB Screening   Were you born outside of the [place]? Decline        Today's PHQ-2 Score:       2024     7:47 AM   PHQ-2 (  [employer])   Q1: Little interest or pleasure in doing things 0    Q2: Feeling down, depressed or hopeless 0    PHQ-2 Score 0    Q1: Little interest or pleasure in doing things Not at all   Q2: Feeling down, depressed or hopeless Not at all   PHQ-2 Score 0       Patient-reported           2024   Substance Use   Alcohol more than 3/day or more than 7/wk No   Do you use any other substances recreationally? No        Social History     Tobacco Use    Smoking status: Former     Current packs/day: 0.00     Types: Cigarettes     Quit date:      Years since quittin.8    Smokeless tobacco: Never   Vaping Use    Vaping status: Never Used   Substance Use Topics    Alcohol use: Yes     Comment: Social use.    Drug use: Never           2024   STI Screening   New  sexual partner(s) since last STI/HIV test? (!) YES no concerns for STDs      Last PSA:   Prostate Specific Antigen Screen   Date Value Ref Range Status   03/03/2022 0.92 0.00 - 4.00 ug/L Final     ASCVD Risk   The 10-year ASCVD risk score (Aby HSAH, et al., 2019) is: 16.9%    Values used to calculate the score:      Age: 64 years      Sex: Male      Is Non- : No      Diabetic: No      Tobacco smoker: No      Systolic Blood Pressure: 146 mmHg      Is BP treated: No      HDL Cholesterol: 43 mg/dL      Total Cholesterol: 236 mg/dL    Reviewed and updated as needed this visit by Provider   Tobacco  Allergies  Meds  Problems  Med Hx  Surg Hx  Fam Hx  Soc   Hx Sexual Activity          Past Medical History:   Diagnosis Date    Acne     Arthritis 2022    UC (ulcerative colitis) (H)     Ulcerative colitis (H)      Past Surgical History:   Procedure Laterality Date    COLONOSCOPY N/A 05/15/2023    Procedure: COLONOSCOPY, WITH POLYPECTOMY AND BIOPSY;  Surgeon: Kasey Maria MD;  Location: [facility]    FOOT SURGERY [facility]      Lab work is in process  Labs reviewed in EPIC  BP Readings from Last 3 Encounters:   11/19/24 (!) 146/88   11/14/23 136/68   05/15/23 107/75    Wt Readings from Last 3 Encounters:   11/19/24 115.8 kg (255 lb 6.4 oz)   11/14/23 116.4 kg (256 lb 9.6 oz)   05/15/23 122.9 kg (271 lb)                  Patient Active Problem List   Diagnosis    Ulcerative rectosigmoiditis without complication (H)    Class 2 obesity due to excess calories without serious comorbidity with body mass index (BMI) of 35.0 to 35.9 in adult     Past Surgical History:   Procedure Laterality Date    COLONOSCOPY N/A 05/15/2023    Procedure: COLONOSCOPY, WITH POLYPECTOMY AND BIOPSY;  Surgeon: Kasey Maria MD;  Location: [facility]    FOOT SURGERY Left        Social History     Tobacco Use    Smoking status: Former     Current packs/day: 0.00     Types: Cigarettes     Quit date: 2005     Years since  quittin.8    Smokeless tobacco: Never   Substance Use Topics    Alcohol use: Yes     Comment: Social use.     Family History   Problem Relation Age of Onset    No Known Problems Mother     No Known Problems Father     No Known Problems Sister     No Known Problems Sister     No Known Problems Brother     Diabetes Maternal Grandmother     No Known Problems Maternal Grandfather     No Known Problems Paternal Grandmother     No Known Problems Paternal Grandfather     Crohn's Disease Son     No Known Problems Son          Current Outpatient Medications   Medication Sig Dispense Refill    minocycline (MINOCIN) 100 MG capsule Take 1 capsule (100 mg) by mouth 2 times daily. 60 capsule 11    sildenafil (VIAGRA) 25 MG tablet Take 1-4 tablets ( mg) by mouth daily as needed (erectile dysfunction). 16 tablet 3     No Known Allergies  Recent Labs   Lab Test 23  1636 23  1641 22  1725 22  0728 22  0744   A1C  --   --   --  5.4  --    *  --   --  145* 172*   HDL 43  --   --  34* 48   TRIG 179*  --   --  114 89   ALT  --   --  26  --   --    CR  --  0.93 1.05  --   --    GFRESTIMATED  --  >90 81  --   --    POTASSIUM  --  4.1 4.1  --   --    TSH  --   --  1.21  --   --           Review of Systems  CONSTITUTIONAL: NEGATIVE for fever, chills, change in weight  INTEGUMENTARY/SKIN: NEGATIVE for worrisome rashes, moles or lesions and POSITIVE for acne on back and some old age spots and multiple moles on torso and back, arms.  EYES: NEGATIVE for vision changes or irritation  ENT/MOUTH: NEGATIVE for ear, mouth and throat problems  RESP: NEGATIVE for significant cough or SOB  CV: NEGATIVE for chest pain, palpitations or peripheral edema  GI: POSITIVE for hx of UC  : negative for dysuria, hematuria, decreased urinary stream, erectile dysfunction  MUSCULOSKELETAL: NEGATIVE for significant arthralgias or myalgia  NEURO: NEGATIVE for weakness, dizziness but POSITIVE for some numbness in face  "since ER visit with headaches and rule out stroke  ENDOCRINE: NEGATIVE for temperature intolerance, skin/hair changes  HEME/ALLERGY/IMMUNE: NEGATIVE for bleeding problems  PSYCHIATRIC: NEGATIVE for changes in mood or affect     Objective    Exam  BP (!) 146/88   Temp 97.2  F (36.2  C) (Tympanic)   Resp 20   Ht 1.8 m (5' 10.87\")   Wt 115.8 kg (255 lb 6.4 oz)   SpO2 97%   BMI 35.76 kg/m     Estimated body mass index is 35.76 kg/m  as calculated from the following:    Height as of this encounter: 1.8 m (5' 10.87\").    Weight as of this encounter: 115.8 kg (255 lb 6.4 oz).    Physical Exam  GENERAL: alert and no distress  EYES: Eyes grossly normal to inspection, PERRL and conjunctivae and sclerae normal  HENT: ear canals and TM's normal, nose and mouth without ulcers or lesions  NECK: no adenopathy, no asymmetry, masses, or scars  RESP: lungs clear to auscultation - no rales, rhonchi or wheezes  CV: regular rate and rhythm, normal S1 S2, no S3 or S4, no murmur, click or rub, no peripheral edema  ABDOMEN: soft, nontender, no hepatosplenomegaly, no masses and bowel sounds normal  MS: no gross musculoskeletal defects noted, no edema  SKIN: no suspicious lesions or rashes  NEURO: Normal strength and tone, mentation intact and speech normal  PSYCH: mentation appears normal, affect normal/bright  LYMPH: normal ant/post cervical, supraclavicular nodes        Signed Electronically by: Sherice Alfredo NP    "

## 2024-11-20 LAB — VIT B12 SERPL-MCNC: 614 PG/ML (ref 232–1245)

## 2024-11-21 VITALS
RESPIRATION RATE: 20 BRPM | TEMPERATURE: 97.2 F | HEIGHT: 71 IN | WEIGHT: 255.4 LBS | DIASTOLIC BLOOD PRESSURE: 88 MMHG | SYSTOLIC BLOOD PRESSURE: 146 MMHG | OXYGEN SATURATION: 97 % | BODY MASS INDEX: 35.76 KG/M2

## 2024-11-21 RX ORDER — ATORVASTATIN CALCIUM 20 MG/1
20 TABLET, FILM COATED ORAL DAILY
Qty: 90 TABLET | Refills: 3 | Status: SHIPPED | OUTPATIENT
Start: 2024-11-21

## 2025-01-12 ENCOUNTER — HEALTH MAINTENANCE LETTER (OUTPATIENT)
Age: 65
End: 2025-01-12

## 2025-03-20 ENCOUNTER — HOSPITAL ENCOUNTER (EMERGENCY)
Facility: CLINIC | Age: 65
Discharge: HOME | End: 2025-03-20
Attending: EMERGENCY MEDICINE
Payer: COMMERCIAL

## 2025-03-20 ENCOUNTER — APPOINTMENT (OUTPATIENT)
Dept: GENERAL RADIOLOGY | Facility: CLINIC | Age: 65
End: 2025-03-20
Attending: EMERGENCY MEDICINE
Payer: COMMERCIAL

## 2025-03-20 VITALS
BODY MASS INDEX: 37.7 KG/M2 | OXYGEN SATURATION: 97 % | TEMPERATURE: 98 F | RESPIRATION RATE: 28 BRPM | WEIGHT: 269.29 LBS | HEART RATE: 90 BPM | SYSTOLIC BLOOD PRESSURE: 156 MMHG | HEIGHT: 71 IN | DIASTOLIC BLOOD PRESSURE: 86 MMHG

## 2025-03-20 DIAGNOSIS — R06.02 SHORTNESS OF BREATH: ICD-10-CM

## 2025-03-20 LAB
ANION GAP SERPL CALCULATED.3IONS-SCNC: 11 MMOL/L (ref 7–15)
ATRIAL RATE - MUSE: 80 BPM
BASOPHILS # BLD AUTO: 0.1 10E3/UL (ref 0–0.2)
BASOPHILS NFR BLD AUTO: 1 %
BUN SERPL-MCNC: 18.5 MG/DL (ref 8–23)
CALCIUM SERPL-MCNC: 9.3 MG/DL (ref 8.8–10.4)
CHLORIDE SERPL-SCNC: 106 MMOL/L (ref 98–107)
CREAT SERPL-MCNC: 0.97 MG/DL (ref 0.67–1.17)
DIASTOLIC BLOOD PRESSURE - MUSE: NORMAL MMHG
EGFRCR SERPLBLD CKD-EPI 2021: 87 ML/MIN/1.73M2
EOSINOPHIL # BLD AUTO: 0.2 10E3/UL (ref 0–0.7)
EOSINOPHIL NFR BLD AUTO: 3 %
ERYTHROCYTE [DISTWIDTH] IN BLOOD BY AUTOMATED COUNT: 12.8 % (ref 10–15)
GLUCOSE SERPL-MCNC: 96 MG/DL (ref 70–99)
HCO3 SERPL-SCNC: 25 MMOL/L (ref 22–29)
HCT VFR BLD AUTO: 40.9 % (ref 40–53)
HGB BLD-MCNC: 13.7 G/DL (ref 13.3–17.7)
IMM GRANULOCYTES # BLD: 0 10E3/UL
IMM GRANULOCYTES NFR BLD: 0 %
INTERPRETATION ECG - MUSE: NORMAL
LYMPHOCYTES # BLD AUTO: 2 10E3/UL (ref 0.8–5.3)
LYMPHOCYTES NFR BLD AUTO: 28 %
MCH RBC QN AUTO: 29.8 PG (ref 26.5–33)
MCHC RBC AUTO-ENTMCNC: 33.5 G/DL (ref 31.5–36.5)
MCV RBC AUTO: 89 FL (ref 78–100)
MONOCYTES # BLD AUTO: 0.7 10E3/UL (ref 0–1.3)
MONOCYTES NFR BLD AUTO: 9 %
NEUTROPHILS # BLD AUTO: 4.2 10E3/UL (ref 1.6–8.3)
NEUTROPHILS NFR BLD AUTO: 59 %
NRBC # BLD AUTO: 0 10E3/UL
NRBC BLD AUTO-RTO: 0 /100
P AXIS - MUSE: 10 DEGREES
PLATELET # BLD AUTO: 243 10E3/UL (ref 150–450)
POTASSIUM SERPL-SCNC: 3.9 MMOL/L (ref 3.4–5.3)
PR INTERVAL - MUSE: 176 MS
QRS DURATION - MUSE: 90 MS
QT - MUSE: 368 MS
QTC - MUSE: 424 MS
R AXIS - MUSE: -9 DEGREES
RBC # BLD AUTO: 4.59 10E6/UL (ref 4.4–5.9)
SODIUM SERPL-SCNC: 142 MMOL/L (ref 135–145)
SYSTOLIC BLOOD PRESSURE - MUSE: NORMAL MMHG
T AXIS - MUSE: 43 DEGREES
TROPONIN T SERPL HS-MCNC: <6 NG/L
VENTRICULAR RATE- MUSE: 80 BPM
WBC # BLD AUTO: 7.1 10E3/UL (ref 4–11)

## 2025-03-20 PROCEDURE — 71046 X-RAY EXAM CHEST 2 VIEWS: CPT

## 2025-03-20 PROCEDURE — 84484 ASSAY OF TROPONIN QUANT: CPT | Performed by: EMERGENCY MEDICINE

## 2025-03-20 PROCEDURE — 93005 ELECTROCARDIOGRAM TRACING: CPT | Performed by: EMERGENCY MEDICINE

## 2025-03-20 PROCEDURE — 99284 EMERGENCY DEPT VISIT MOD MDM: CPT | Performed by: EMERGENCY MEDICINE

## 2025-03-20 PROCEDURE — 36415 COLL VENOUS BLD VENIPUNCTURE: CPT | Performed by: EMERGENCY MEDICINE

## 2025-03-20 PROCEDURE — 93010 ELECTROCARDIOGRAM REPORT: CPT | Performed by: EMERGENCY MEDICINE

## 2025-03-20 PROCEDURE — 99285 EMERGENCY DEPT VISIT HI MDM: CPT | Mod: 25 | Performed by: EMERGENCY MEDICINE

## 2025-03-20 PROCEDURE — 80048 BASIC METABOLIC PNL TOTAL CA: CPT | Performed by: EMERGENCY MEDICINE

## 2025-03-20 PROCEDURE — 85025 COMPLETE CBC W/AUTO DIFF WBC: CPT | Performed by: EMERGENCY MEDICINE

## 2025-03-20 ASSESSMENT — ACTIVITIES OF DAILY LIVING (ADL)
ADLS_ACUITY_SCORE: 41
ADLS_ACUITY_SCORE: 41

## 2025-03-20 ASSESSMENT — COLUMBIA-SUICIDE SEVERITY RATING SCALE - C-SSRS
1. IN THE PAST MONTH, HAVE YOU WISHED YOU WERE DEAD OR WISHED YOU COULD GO TO SLEEP AND NOT WAKE UP?: NO
6. HAVE YOU EVER DONE ANYTHING, STARTED TO DO ANYTHING, OR PREPARED TO DO ANYTHING TO END YOUR LIFE?: NO
2. HAVE YOU ACTUALLY HAD ANY THOUGHTS OF KILLING YOURSELF IN THE PAST MONTH?: NO

## 2025-03-20 NOTE — ED PROVIDER NOTES
History     Chief Complaint   Patient presents with    Shortness of Breath     HPI  Esdras Snider is a 65 year old male who who presents for shortness of breath.  The patient says that since last night he has had episodes where he just feels like he cannot get a deep breath in.  It feels uncomfortable but usually he is able to get his breathing under control fairly quickly.  He says that it made it hard for him to sleep overnight and he went outside and took a walk at about 3 AM because he just felt like he could not get a deep breath.  He felt like this helped his symptoms.  No chest pain or diaphoresis with this.  No cough or hemoptysis.  No abdominal pain, nausea, vomiting, or diaphoresis.  No recent surgery or immobilization.    Allergies:  No Known Allergies    Problem List:    Patient Active Problem List    Diagnosis Date Noted    Class 2 obesity due to excess calories without serious comorbidity with body mass index (BMI) of 35.0 to 35.9 in adult 11/14/2023     Priority: Medium    Ulcerative rectosigmoiditis without complication (H) 12/21/2015     Priority: Medium        Past Medical History:    Past Medical History:   Diagnosis Date    Acne     Arthritis 2022    UC (ulcerative colitis) (H)     Ulcerative colitis (H)        Past Surgical History:    Past Surgical History:   Procedure Laterality Date    COLONOSCOPY N/A 05/15/2023    Procedure: COLONOSCOPY, WITH POLYPECTOMY AND BIOPSY;  Surgeon: Kasey Maria MD;  Location: [facility]    FOOT SURGERY Left        Family History:    Family History   Problem Relation Age of Onset    No Known Problems Mother     No Known Problems Father     No Known Problems Sister     No Known Problems Sister     No Known Problems Brother     Diabetes Maternal Grandmother     No Known Problems Maternal Grandfather     No Known Problems Paternal Grandmother     No Known Problems Paternal Grandfather     Crohn's Disease Son     No Known Problems Son        Social History:  Marital  "Status:  Single [1]  Social History     Tobacco Use    Smoking status: Former     Current packs/day: 0.00     Types: Cigarettes     Quit date:      Years since quittin.2    Smokeless tobacco: Never   Vaping Use    Vaping status: Never Used   Substance Use Topics    Alcohol use: Yes     Comment: Social use.    Drug use: Never        Medications:    atorvastatin (LIPITOR) 20 MG tablet  minocycline (MINOCIN) 100 MG capsule  sildenafil (VIAGRA) 25 MG tablet          Review of Systems    Physical Exam   BP: (!) 156/86  Pulse: 90  Temp: 98  F (36.7  C)  Resp: 28  Height: 180.3 cm (5' 11\")  Weight: 122.1 kg (269 lb 4.7 oz)  SpO2: 97 %      Physical Exam  Constitutional:       General: He is not in acute distress.     Appearance: He is well-developed.   HENT:      Head: Normocephalic and atraumatic.   Cardiovascular:      Rate and Rhythm: Normal rate.      Heart sounds: No murmur heard.  Pulmonary:      Effort: Pulmonary effort is normal. No respiratory distress.      Breath sounds: No stridor.   Musculoskeletal:      Right lower leg: No edema.      Left lower leg: No edema.   Skin:     General: Skin is warm and dry.   Neurological:      Mental Status: He is alert.         ED Course        Procedures              EKG Interpretation:      Interpreted by Tyrone Mckeon MD  Time reviewed:   Symptoms at time of EKG: shortness of breath   Rhythm: normal sinus   Rate: normal  Axis: normal  Ectopy: none  Conduction: normal  ST Segments/ T Waves: No ST-T wave changes  Q Waves: none  Comparison to prior: Unchanged    Clinical Impression: normal EKG      Critical Care time:  none     None         Results for orders placed or performed during the hospital encounter of 25 (from the past 24 hours)   Basic metabolic panel   Result Value Ref Range    Sodium 142 135 - 145 mmol/L    Potassium 3.9 3.4 - 5.3 mmol/L    Chloride 106 98 - 107 mmol/L    Carbon Dioxide (CO2) 25 22 - 29 mmol/L    Anion Gap 11 7 - 15 mmol/L "    Urea Nitrogen 18.5 8.0 - 23.0 mg/dL    Creatinine 0.97 0.67 - 1.17 mg/dL    GFR Estimate 87 >60 mL/min/1.73m2    Calcium 9.3 8.8 - 10.4 mg/dL    Glucose 96 70 - 99 mg/dL   CBC with Platelets & Differential    Narrative    The following orders were created for panel order CBC with Platelets & Differential.  Procedure                               Abnormality         Status                     ---------                               -----------         ------                     CBC with platelets and ...[[id number]]                      Final result                 Please view results for these tests on the individual orders.   Troponin T, High Sensitivity   Result Value Ref Range    Troponin T, High Sensitivity <6 <=22 ng/L   CBC with platelets and differential   Result Value Ref Range    WBC Count 7.1 4.0 - 11.0 10e3/uL    RBC Count 4.59 4.40 - 5.90 10e6/uL    Hemoglobin 13.7 13.3 - 17.7 g/dL    Hematocrit 40.9 40.0 - 53.0 %    MCV 89 78 - 100 fL    MCH 29.8 26.5 - 33.0 pg    MCHC 33.5 31.5 - 36.5 g/dL    RDW 12.8 10.0 - 15.0 %    Platelet Count 243 150 - 450 10e3/uL    % Neutrophils 59 %    % Lymphocytes 28 %    % Monocytes 9 %    % Eosinophils 3 %    % Basophils 1 %    % Immature Granulocytes 0 %    NRBCs per 100 WBC 0 <1 /100    Absolute Neutrophils 4.2 1.6 - 8.3 10e3/uL    Absolute Lymphocytes 2.0 0.8 - 5.3 10e3/uL    Absolute Monocytes 0.7 0.0 - 1.3 10e3/uL    Absolute Eosinophils 0.2 0.0 - 0.7 10e3/uL    Absolute Basophils 0.1 0.0 - 0.2 10e3/uL    Absolute Immature Granulocytes 0.0 <=0.4 10e3/uL    Absolute NRBCs 0.0 10e3/uL   EKG 12-lead, tracing only   Result Value Ref Range    Systolic Blood Pressure  mmHg    Diastolic Blood Pressure  mmHg    Ventricular Rate 80 BPM    Atrial Rate 80 BPM    [place] Interval 176 ms    QRS Duration 90 ms     ms    QTc 424 ms    P Axis 10 degrees    R AXIS -9 degrees    T Axis 43 degrees    Interpretation ECG       Sinus rhythm  Normal ECG  No previous ECGs available      XR Chest 2 Views    Narrative    EXAM: XR CHEST 2 VIEWS  LOCATION: [facility]  DATE: 3/20/2025    INDICATION: shortness of breath  COMPARISON: None.      Impression    IMPRESSION: Negative chest.       Medications - No data to display    Assessments & Plan (with Medical Decision Making)   65-year-old male presents for evaluation of shortness of breath since last night.  He is nontoxic and well-appearing.  EKG is sinus rhythm without signs of ischemia or dysrhythmia.  Chest x-ray obtained, images interpreted independently as well as radiology read reviewed, no signs of infiltrate to suggest pneumonia, no signs of heart failure.  Electrolytes are within normal limits.  Hemoglobin is 13.7, no signs of anemia.  Troponin is very low making ACS unlikely.  Unclear cause of the patient's symptoms.  He is well-appearing and safe to discharge with instructions to return if he has worsening of his symptoms or other concerns, otherwise follow-up in clinic.  The patient is in agreement of this plan.    I have reviewed the nursing notes.    I have reviewed the findings, diagnosis, plan and need for follow up with the patient.           Medical Decision Making  The patient's presentation was of moderate complexity (an undiagnosed new problem with uncertain prognosis).    The patient's evaluation involved:  ordering and/or review of 3+ test(s) in this encounter (see separate area of note for details)  independent interpretation of testing performed by another health professional (see separate area of note for details)    The patient's management necessitated only low risk treatment.        Discharge Medication List as of 3/20/2025  7:06 PM          Final diagnoses:   Shortness of breath       3/20/2025   [facility] EMERGENCY DEPT       Tyrone Mckeon MD  03/20/25 [id number]

## 2025-03-20 NOTE — ED TRIAGE NOTES
"Pt presents with intermittent SOB. \"I have always had it.\" Reports feeling more so SOB since last night. Denies cardiac, COPD/Asthma hx. Feels like he can't get a deep breath. Denies viral illness sx. Has not been seen for sx.      Triage Assessment (Adult)       Row Name 03/20/25 [id number]          Respiratory WDL    Respiratory WDL X;rhythm/pattern     Rhythm/Pattern, Respiratory shortness of breath        Skin Circulation/Temperature WDL    Skin Circulation/Temperature WDL WDL        Cardiac WDL    Cardiac WDL WDL        Peripheral/Neurovascular WDL    Peripheral Neurovascular WDL WDL        Cognitive/Neuro/Behavioral WDL    Cognitive/Neuro/Behavioral WDL WDL                     "

## 2025-03-21 NOTE — DISCHARGE INSTRUCTIONS
I am not certain what caused your symptoms but so far the tests are reassuring.  Drink plenty of fluids and return for increasing pain, difficulty breathing, or other concerns.  Otherwise follow-up in clinic.

## 2025-03-23 LAB
ATRIAL RATE - MUSE: 80 BPM
DIASTOLIC BLOOD PRESSURE - MUSE: NORMAL MMHG
INTERPRETATION ECG - MUSE: NORMAL
P AXIS - MUSE: 10 DEGREES
PR INTERVAL - MUSE: 176 MS
QRS DURATION - MUSE: 90 MS
QT - MUSE: 368 MS
QTC - MUSE: 424 MS
R AXIS - MUSE: -9 DEGREES
SYSTOLIC BLOOD PRESSURE - MUSE: NORMAL MMHG
T AXIS - MUSE: 43 DEGREES
VENTRICULAR RATE- MUSE: 80 BPM

## 2025-05-13 ENCOUNTER — PATIENT OUTREACH (OUTPATIENT)
Dept: CARE COORDINATION | Facility: CLINIC | Age: 65
End: 2025-05-13
Payer: COMMERCIAL

## (undated) DEVICE — ENDO FORCEP ENDOJAW BIOPSY 2.8MMX230CM FB-220U

## (undated) RX ORDER — PROPOFOL 10 MG/ML
INJECTION, EMULSION INTRAVENOUS
Status: DISPENSED
Start: 2023-05-15